# Patient Record
Sex: MALE | Race: WHITE | Employment: FULL TIME | URBAN - METROPOLITAN AREA
[De-identification: names, ages, dates, MRNs, and addresses within clinical notes are randomized per-mention and may not be internally consistent; named-entity substitution may affect disease eponyms.]

---

## 2017-01-04 ENCOUNTER — TELEPHONE (OUTPATIENT)
Dept: FAMILY MEDICINE CLINIC | Facility: CLINIC | Age: 42
End: 2017-01-04

## 2017-01-04 NOTE — TELEPHONE ENCOUNTER
----- Message from Betsy Justin MD sent at 12/30/2016  2:18 PM CST -----  Please inform patient of normal XR knee. Would he like a physical therapy referral to see if this helps with the pain?

## 2017-01-04 NOTE — TELEPHONE ENCOUNTER
Verified pt name and . Reviewed Xray results per doctor's instructions. Pt states he does not want physical therapy at this time and he will return to office if pain worsens or does not improve. Pt had no further questions at this time.

## 2017-05-01 ENCOUNTER — OFFICE VISIT (OUTPATIENT)
Dept: FAMILY MEDICINE CLINIC | Facility: CLINIC | Age: 42
End: 2017-05-01

## 2017-05-01 VITALS
TEMPERATURE: 98 F | HEART RATE: 76 BPM | BODY MASS INDEX: 41.75 KG/M2 | HEIGHT: 73 IN | SYSTOLIC BLOOD PRESSURE: 144 MMHG | WEIGHT: 315 LBS | RESPIRATION RATE: 18 BRPM | DIASTOLIC BLOOD PRESSURE: 84 MMHG

## 2017-05-01 DIAGNOSIS — M25.562 CHRONIC PAIN OF LEFT KNEE: Primary | ICD-10-CM

## 2017-05-01 DIAGNOSIS — G89.29 CHRONIC PAIN OF LEFT KNEE: Primary | ICD-10-CM

## 2017-05-01 DIAGNOSIS — F32.A DEPRESSION, UNSPECIFIED DEPRESSION TYPE: ICD-10-CM

## 2017-05-01 PROCEDURE — 99212 OFFICE O/P EST SF 10 MIN: CPT | Performed by: FAMILY MEDICINE

## 2017-05-01 PROCEDURE — 99214 OFFICE O/P EST MOD 30 MIN: CPT | Performed by: FAMILY MEDICINE

## 2017-05-02 NOTE — PROGRESS NOTES
HPI:    Aron Valero is a 43year old male presents to clinic with multiple concerns. Left Knee Pain - was seen for this issue in December, symptoms seem to be getting worse. States that location of pain varies but he feels like knee is weak.  Would nervous/anxious and does not have insomnia.          PHYSICAL EXAM:      05/01/17  1623   BP: 144/84   Pulse: 76   Temp: 98.4 °F (36.9 °C)   TempSrc: Oral   Resp: 18   Height: 6' 1\" (1.854 m)   Weight: 322 lb (146.058 kg)      Physical Exam   Constitutiona

## 2017-05-17 ENCOUNTER — OFFICE VISIT (OUTPATIENT)
Dept: ORTHOPEDICS CLINIC | Facility: CLINIC | Age: 42
End: 2017-05-17

## 2017-05-17 DIAGNOSIS — S76.112A STRAIN OF LEFT QUADRICEPS TENDON, INITIAL ENCOUNTER: Primary | ICD-10-CM

## 2017-05-17 PROCEDURE — 99212 OFFICE O/P EST SF 10 MIN: CPT | Performed by: ORTHOPAEDIC SURGERY

## 2017-05-17 PROCEDURE — 99243 OFF/OP CNSLTJ NEW/EST LOW 30: CPT | Performed by: ORTHOPAEDIC SURGERY

## 2017-05-17 RX ORDER — MELOXICAM 15 MG/1
15 TABLET ORAL DAILY
Qty: 30 TABLET | Refills: 1 | Status: SHIPPED | OUTPATIENT
Start: 2017-05-17 | End: 2018-02-19 | Stop reason: ALTCHOICE

## 2017-05-17 RX ORDER — IBUPROFEN 200 MG
10000 TABLET ORAL AS NEEDED
COMMUNITY
End: 2019-11-15

## 2017-05-17 NOTE — H&P
Chief Complaint: Left knee pain    NURSING INTAKE COMMENTS: Patient presents with:  Consult: Left knee pain -- Xrays taken 12/20/16. Onset 9 mths and states he felt knee cap shift while kneeling down. History of present illness:   This 43year old mal Negative Negative      Lachman Negative Negative      Pivot Shift Negative Negative      Posterolateral corner Negative Negative        Varus Stress        0 Degrees Negative Negative      30 Degrees Negative Negative        Valgus Stress         0 Degrees

## 2017-05-24 ENCOUNTER — OFFICE VISIT (OUTPATIENT)
Dept: FAMILY MEDICINE CLINIC | Facility: CLINIC | Age: 42
End: 2017-05-24

## 2017-05-24 ENCOUNTER — LAB ENCOUNTER (OUTPATIENT)
Dept: LAB | Age: 42
End: 2017-05-24
Attending: FAMILY MEDICINE
Payer: COMMERCIAL

## 2017-05-24 ENCOUNTER — PRIOR ORIGINAL RECORDS (OUTPATIENT)
Dept: OTHER | Age: 42
End: 2017-05-24

## 2017-05-24 VITALS
DIASTOLIC BLOOD PRESSURE: 79 MMHG | RESPIRATION RATE: 20 BRPM | BODY MASS INDEX: 40 KG/M2 | HEART RATE: 67 BPM | TEMPERATURE: 98 F | WEIGHT: 306 LBS | SYSTOLIC BLOOD PRESSURE: 130 MMHG

## 2017-05-24 DIAGNOSIS — Z00.00 ANNUAL PHYSICAL EXAM: ICD-10-CM

## 2017-05-24 DIAGNOSIS — Z00.00 ANNUAL PHYSICAL EXAM: Primary | ICD-10-CM

## 2017-05-24 PROCEDURE — 99396 PREV VISIT EST AGE 40-64: CPT | Performed by: FAMILY MEDICINE

## 2017-05-24 PROCEDURE — 80061 LIPID PANEL: CPT

## 2017-05-24 PROCEDURE — 84443 ASSAY THYROID STIM HORMONE: CPT

## 2017-05-24 PROCEDURE — 36415 COLL VENOUS BLD VENIPUNCTURE: CPT

## 2017-05-24 PROCEDURE — 83036 HEMOGLOBIN GLYCOSYLATED A1C: CPT

## 2017-05-24 PROCEDURE — 85025 COMPLETE CBC W/AUTO DIFF WBC: CPT

## 2017-05-24 PROCEDURE — 80053 COMPREHEN METABOLIC PANEL: CPT

## 2017-05-24 RX ORDER — MULTIVITAMIN
1 TABLET ORAL DAILY
COMMUNITY

## 2017-05-24 NOTE — PROGRESS NOTES
HPI:    Zaria Reagan is a 43year old male presents to clinic for annual physical exam.   Concerns/Complaints regarding health: none, no recent illnesses  Chronic Medical Issues: sees Dr. Taina Sandoval for anxiety and depression  Sexually Active?  Yes with wif PHYSICAL EXAM:      05/24/17  0902   BP: 130/79   Pulse: 67   Temp: 98.1 °F (36.7 °C)   TempSrc: Temporal   Resp: 20   Weight: 306 lb (138.801 kg)      Physical Exam   Constitutional: He is well-developed, well-nourished, and in no distress.    HENT:   He

## 2017-06-09 PROBLEM — F32.1 MAJOR DEPRESSIVE DISORDER, SINGLE EPISODE, MODERATE (HCC): Status: ACTIVE | Noted: 2017-06-09

## 2017-06-20 ENCOUNTER — TELEPHONE (OUTPATIENT)
Dept: FAMILY MEDICINE CLINIC | Facility: CLINIC | Age: 42
End: 2017-06-20

## 2017-06-22 NOTE — TELEPHONE ENCOUNTER
Spoke with pt and informed him that his current referral was approved for six visit and he only used one visit so therefore a new referral is not required. Pt voiced understanding.

## 2017-07-17 ENCOUNTER — OFFICE VISIT (OUTPATIENT)
Dept: ORTHOPEDICS CLINIC | Facility: CLINIC | Age: 42
End: 2017-07-17

## 2017-07-17 ENCOUNTER — PRIOR ORIGINAL RECORDS (OUTPATIENT)
Dept: OTHER | Age: 42
End: 2017-07-17

## 2017-07-17 ENCOUNTER — HOSPITAL ENCOUNTER (EMERGENCY)
Facility: HOSPITAL | Age: 42
Discharge: HOME | End: 2017-07-17
Attending: EMERGENCY MEDICINE
Payer: COMMERCIAL

## 2017-07-17 VITALS — HEART RATE: 74 BPM | SYSTOLIC BLOOD PRESSURE: 126 MMHG | RESPIRATION RATE: 16 BRPM | DIASTOLIC BLOOD PRESSURE: 72 MMHG

## 2017-07-17 VITALS
RESPIRATION RATE: 18 BRPM | DIASTOLIC BLOOD PRESSURE: 70 MMHG | HEIGHT: 73 IN | BODY MASS INDEX: 37.11 KG/M2 | WEIGHT: 280 LBS | HEART RATE: 70 BPM | SYSTOLIC BLOOD PRESSURE: 116 MMHG | OXYGEN SATURATION: 98 %

## 2017-07-17 DIAGNOSIS — M76.899 QUADRICEPS TENDONITIS: ICD-10-CM

## 2017-07-17 DIAGNOSIS — R55 SYNCOPE, NEAR: Primary | ICD-10-CM

## 2017-07-17 DIAGNOSIS — S76.302A HAMSTRING INJURY, LEFT, INITIAL ENCOUNTER: ICD-10-CM

## 2017-07-17 DIAGNOSIS — M25.562 ARTHRALGIA OF LEFT KNEE: Primary | ICD-10-CM

## 2017-07-17 LAB
ANION GAP SERPL CALC-SCNC: 5 MMOL/L (ref 0–18)
BASOPHILS # BLD: 0 K/UL (ref 0–0.2)
BASOPHILS NFR BLD: 0 %
BILIRUB UR QL: NEGATIVE
BUN SERPL-MCNC: 15 MG/DL (ref 8–20)
BUN/CREAT SERPL: 16.5 (ref 10–20)
CALCIUM SERPL-MCNC: 8.5 MG/DL (ref 8.5–10.5)
CHLORIDE SERPL-SCNC: 110 MMOL/L (ref 95–110)
CHOLEST SERPL-MCNC: 157 MG/DL (ref 110–200)
CLARITY UR: CLEAR
CO2 SERPL-SCNC: 23 MMOL/L (ref 22–32)
COLOR UR: YELLOW
CREAT SERPL-MCNC: 0.91 MG/DL (ref 0.5–1.5)
EOSINOPHIL # BLD: 0.1 K/UL (ref 0–0.7)
EOSINOPHIL NFR BLD: 1 %
ERYTHROCYTE [DISTWIDTH] IN BLOOD BY AUTOMATED COUNT: 13.3 % (ref 11–15)
GLUCOSE SERPL-MCNC: 124 MG/DL (ref 70–99)
GLUCOSE UR-MCNC: NEGATIVE MG/DL
HCT VFR BLD AUTO: 41.1 % (ref 41–52)
HDLC SERPL-MCNC: 28 MG/DL
HGB BLD-MCNC: 13.9 G/DL (ref 13.5–17.5)
HGB UR QL STRIP.AUTO: NEGATIVE
KETONES UR-MCNC: NEGATIVE MG/DL
LDLC SERPL CALC-MCNC: 113 MG/DL (ref 0–99)
LEUKOCYTE ESTERASE UR QL STRIP.AUTO: NEGATIVE
LYMPHOCYTES # BLD: 0.9 K/UL (ref 1–4)
LYMPHOCYTES NFR BLD: 14 %
MCH RBC QN AUTO: 29.9 PG (ref 27–32)
MCHC RBC AUTO-ENTMCNC: 33.8 G/DL (ref 32–37)
MCV RBC AUTO: 88.3 FL (ref 80–100)
MONOCYTES # BLD: 0.4 K/UL (ref 0–1)
MONOCYTES NFR BLD: 6 %
NEUTROPHILS # BLD AUTO: 5.1 K/UL (ref 1.8–7.7)
NEUTROPHILS NFR BLD: 79 %
NITRITE UR QL STRIP.AUTO: NEGATIVE
NONHDLC SERPL-MCNC: 129 MG/DL
OSMOLALITY UR CALC.SUM OF ELEC: 288 MOSM/KG (ref 275–295)
PH UR: 6 [PH] (ref 5–8)
PLATELET # BLD AUTO: 196 K/UL (ref 140–400)
PMV BLD AUTO: 9.3 FL (ref 7.4–10.3)
POTASSIUM SERPL-SCNC: 3.7 MMOL/L (ref 3.3–5.1)
PROT UR-MCNC: NEGATIVE MG/DL
RBC # BLD AUTO: 4.66 M/UL (ref 4.5–5.9)
SODIUM SERPL-SCNC: 138 MMOL/L (ref 136–144)
SP GR UR STRIP: 1.01 (ref 1–1.03)
TRIGL SERPL-MCNC: 81 MG/DL (ref 1–149)
TROPONIN I SERPL-MCNC: 0.04 NG/ML (ref ?–0.03)
TROPONIN I SERPL-MCNC: 0.04 NG/ML (ref ?–0.03)
UROBILINOGEN UR STRIP-ACNC: <2
VIT C UR-MCNC: NEGATIVE MG/DL
WBC # BLD AUTO: 6.5 K/UL (ref 4–11)

## 2017-07-17 PROCEDURE — 81003 URINALYSIS AUTO W/O SCOPE: CPT | Performed by: EMERGENCY MEDICINE

## 2017-07-17 PROCEDURE — 80061 LIPID PANEL: CPT | Performed by: EMERGENCY MEDICINE

## 2017-07-17 PROCEDURE — 80048 BASIC METABOLIC PNL TOTAL CA: CPT | Performed by: EMERGENCY MEDICINE

## 2017-07-17 PROCEDURE — 99285 EMERGENCY DEPT VISIT HI MDM: CPT

## 2017-07-17 PROCEDURE — 84484 ASSAY OF TROPONIN QUANT: CPT | Performed by: EMERGENCY MEDICINE

## 2017-07-17 PROCEDURE — 96360 HYDRATION IV INFUSION INIT: CPT

## 2017-07-17 PROCEDURE — 93010 ELECTROCARDIOGRAM REPORT: CPT | Performed by: EMERGENCY MEDICINE

## 2017-07-17 PROCEDURE — 85025 COMPLETE CBC W/AUTO DIFF WBC: CPT | Performed by: EMERGENCY MEDICINE

## 2017-07-17 PROCEDURE — 20610 DRAIN/INJ JOINT/BURSA W/O US: CPT | Performed by: ORTHOPAEDIC SURGERY

## 2017-07-17 PROCEDURE — 93005 ELECTROCARDIOGRAM TRACING: CPT

## 2017-07-17 NOTE — PROGRESS NOTES
Patient presents for follow-up. He continues to have pain in the quadriceps tendon area of his left knee but now has developed lateral hamstring pain as well and stiffness in his quad tendon as well as his hamstring tendon.   We will do formal physical the

## 2017-07-17 NOTE — PROGRESS NOTES
Per verbal order from VT, draw up 3ml of Kenalog 10 and 3ml of 1% lidocaine for cortisone injection to left knee.  Flory Tong

## 2017-07-17 NOTE — ED NOTES
Care assumed from EMS pt presents from [facility] S/P near syncopal episode after receiving a cortisone injection into his L knee. Became diaphoretic/bradycardic and subsequently received 250 ml NS and 15 mg Ephedrine prior to ED presentation.  Ar

## 2017-07-17 NOTE — PROCEDURES
Procedure: The risks and benefits of a cortisone injection were discussed with the patient. An informational sheet was also provided and the patient had ample time to review it.   Under sterile preparation, the left knee was injected with 30 mg of Kenalog

## 2017-07-17 NOTE — ED PROVIDER NOTES
Patient Seen in: [facility] AND [facility] Emergency Department    History   Patient presents with:  Syncope (cardiovascular, neurologic)    Stated Complaint:     HPI    43year old male who presents with near-syncopal episode occurring this a.m just pta.  Pt was status: Never Smoker                                                              Alcohol use: Yes           0.0 oz/week     Comment: occasional      Review of Systems    Positive for stated complaint:   Other systems are as noted in HPI.   Constitutional a Troponin 0.04 (*)     All other components within normal limits   LIPID PANEL - Abnormal; Notable for the following:     LDL Cholesterol 113 (*)     All other components within normal limits   TROPONIN I, 2 HOUR - Abnormal; Notable for the following:     T similar to previous clinic EKGs and has calcium score = 0 with normal CCTA 1 year ago. Troponin indeterminate at 0.04, same 2nd troponin. Pt exercising vigorously outpatient with no pain, syncope, or other abnormal exertional sx.  Will dc, advised pt to f/u

## 2017-07-17 NOTE — PROGRESS NOTES
07/17/17 1003   Clinical Encounter Type   Visited With Patient   Routine Visit Introduction   Crisis Visit ED  (Code Blue-Non-Critical)   Patient Spiritual Encounters   Spiritual Assessment Completed 2    introduced pt to spiritual care.  Pt had

## 2017-07-17 NOTE — ED INITIAL ASSESSMENT (HPI)
Via EMS from [facility] where he had a near syncopal episode after a cortisone injection into his L knee.  On EMS arrival bradycardic/diaphoretic Pt subsequently received a 250 ml fluid bolus and 15mg ephedrine by Dr Lui Spotted

## 2017-08-01 PROBLEM — F32.1 MAJOR DEPRESSIVE DISORDER, SINGLE EPISODE, MODERATE (HCC): Status: RESOLVED | Noted: 2017-06-09 | Resolved: 2017-08-01

## 2017-08-01 PROBLEM — F32.5 MAJOR DEPRESSIVE DISORDER, SINGLE EPISODE, IN REMISSION: Status: ACTIVE | Noted: 2017-06-09

## 2017-08-01 PROBLEM — F32.5 MAJOR DEPRESSIVE DISORDER, SINGLE EPISODE, IN REMISSION (HCC): Status: ACTIVE | Noted: 2017-06-09

## 2017-08-04 ENCOUNTER — TELEPHONE (OUTPATIENT)
Dept: CARDIOLOGY CLINIC | Facility: CLINIC | Age: 42
End: 2017-08-04

## 2017-08-04 NOTE — TELEPHONE ENCOUNTER
Pt calling to schedule hospital follow up appt. Pt was in the E.R. 7/17, pt is calling to schedule with Dr. Pamela Antonio, pls call PD:[phone], ok leave message,thank you.

## 2017-08-25 ENCOUNTER — TELEPHONE (OUTPATIENT)
Dept: FAMILY MEDICINE CLINIC | Facility: CLINIC | Age: 42
End: 2017-08-25

## 2017-08-25 DIAGNOSIS — I10 HYPERTENSION, UNSPECIFIED TYPE: Primary | ICD-10-CM

## 2017-08-29 ENCOUNTER — OFFICE VISIT (OUTPATIENT)
Dept: CARDIOLOGY CLINIC | Facility: CLINIC | Age: 42
End: 2017-08-29

## 2017-08-29 VITALS
HEART RATE: 68 BPM | SYSTOLIC BLOOD PRESSURE: 104 MMHG | WEIGHT: 258 LBS | BODY MASS INDEX: 34 KG/M2 | DIASTOLIC BLOOD PRESSURE: 60 MMHG

## 2017-08-29 DIAGNOSIS — R94.31 ABNORMAL ECG: Primary | ICD-10-CM

## 2017-08-29 PROCEDURE — 99212 OFFICE O/P EST SF 10 MIN: CPT | Performed by: INTERNAL MEDICINE

## 2017-08-29 PROCEDURE — 99214 OFFICE O/P EST MOD 30 MIN: CPT | Performed by: INTERNAL MEDICINE

## 2017-08-29 NOTE — PROGRESS NOTES
Mikki Smith is a 43year old male. Patient presents with:  Hospital F/U    HPI:   This is a pleasant 41-year-old with abnormal EKG and history of chest fluttering who now presents for assessment prior to possibly participating in a [place] run.   Laura well developed, well nourished,in no apparent distress  SKIN: no rashes,no suspicious lesions  HEENT: atraumatic, normocephalic,ears and throat are clear  NECK: supple,no adenopathy,no bruits  LUNGS: clear to auscultation  CARDIO: regular rate and rhythm,n

## 2017-09-01 ENCOUNTER — HOSPITAL ENCOUNTER (OUTPATIENT)
Dept: CV DIAGNOSTICS | Facility: HOSPITAL | Age: 42
Discharge: HOME | End: 2017-09-01
Attending: INTERNAL MEDICINE
Payer: COMMERCIAL

## 2017-09-01 ENCOUNTER — NURSE TRIAGE (OUTPATIENT)
Dept: OTHER | Age: 42
End: 2017-09-01

## 2017-09-01 DIAGNOSIS — M25.561 ACUTE PAIN OF RIGHT KNEE: Primary | ICD-10-CM

## 2017-09-01 DIAGNOSIS — R94.31 ABNORMAL ECG: ICD-10-CM

## 2017-09-01 PROCEDURE — 93017 CV STRESS TEST TRACING ONLY: CPT | Performed by: INTERNAL MEDICINE

## 2017-09-01 PROCEDURE — 93016 CV STRESS TEST SUPVJ ONLY: CPT | Performed by: INTERNAL MEDICINE

## 2017-09-01 PROCEDURE — 93350 STRESS TTE ONLY: CPT | Performed by: INTERNAL MEDICINE

## 2017-09-01 PROCEDURE — 93018 CV STRESS TEST I&R ONLY: CPT | Performed by: INTERNAL MEDICINE

## 2017-09-01 NOTE — TELEPHONE ENCOUNTER
Reason for Disposition  • Patient wants to be seen    Protocols used: KNEE INJURY-A-OH  Action Requested: Summary for Provider     []  Critical Lab, Recommendations Needed  [x] Need Additional Advice  []   FYI    []   Need Orders  [] Need Medications Stanislav Zuniga

## 2017-09-01 NOTE — TELEPHONE ENCOUNTER
Pt contacted and MD message below relayed to pt about going to the Urgent Care in [facility] today. Pt informed that an RN will follow up tomorrow. Chart sent to Call back folder.      Pt verbalizes understanding, expresses intent to comply, denies any fur

## 2017-09-01 NOTE — TELEPHONE ENCOUNTER
Please advise him to go to urgent care on [facility] in [facility] if possible.

## 2017-09-01 NOTE — IMAGING NOTE
Dr. Amor Gomez notified of significant change in ECG tracings from July 2017. Was instructed to proceed with stress echo.

## 2017-09-02 ENCOUNTER — HOSPITAL ENCOUNTER (OUTPATIENT)
Age: 42
Discharge: HOME | End: 2017-09-02
Attending: FAMILY MEDICINE
Payer: COMMERCIAL

## 2017-09-02 ENCOUNTER — APPOINTMENT (OUTPATIENT)
Dept: GENERAL RADIOLOGY | Age: 42
End: 2017-09-02
Attending: FAMILY MEDICINE
Payer: COMMERCIAL

## 2017-09-02 VITALS
HEART RATE: 59 BPM | RESPIRATION RATE: 16 BRPM | SYSTOLIC BLOOD PRESSURE: 141 MMHG | DIASTOLIC BLOOD PRESSURE: 74 MMHG | TEMPERATURE: 98 F | OXYGEN SATURATION: 99 %

## 2017-09-02 DIAGNOSIS — S83.401A SPRAIN OF COLLATERAL LIGAMENT OF RIGHT KNEE, INITIAL ENCOUNTER: Primary | ICD-10-CM

## 2017-09-02 PROCEDURE — 73562 X-RAY EXAM OF KNEE 3: CPT | Performed by: FAMILY MEDICINE

## 2017-09-02 PROCEDURE — 99214 OFFICE O/P EST MOD 30 MIN: CPT

## 2017-09-02 PROCEDURE — 99213 OFFICE O/P EST LOW 20 MIN: CPT

## 2017-09-02 RX ORDER — ETODOLAC 500 MG/1
500 TABLET, FILM COATED ORAL 2 TIMES DAILY
Qty: 28 TABLET | Refills: 0 | Status: SHIPPED | OUTPATIENT
Start: 2017-09-02 | End: 2017-09-16

## 2017-09-02 NOTE — TELEPHONE ENCOUNTER
Being seen right now in [address] [place] [address]. Stated will call him next week to check on how he is doing.

## 2017-09-02 NOTE — ED INITIAL ASSESSMENT (HPI)
Pt complaints of right knee pain since Thursday injured knee while playing softball reports banged knee against another player pain to outside of knee. Slight pain with walking worse with going downstairs.

## 2017-09-02 NOTE — ED NOTES
All orders complete pt leaving IC stable no acute distress noted, ambulates steady gait.  RX given and explained pt verbalizes DC and follow up instructions

## 2017-09-07 ENCOUNTER — TELEPHONE (OUTPATIENT)
Dept: CARDIOLOGY CLINIC | Facility: CLINIC | Age: 42
End: 2017-09-07

## 2017-09-07 NOTE — TELEPHONE ENCOUNTER
Missed that, detailed message left for pt per his request.  Encouraged to call with questions or concerns.

## 2017-09-07 NOTE — TELEPHONE ENCOUNTER
Please advise on test results and if there are any recommendations prior to participating is sports event.

## 2017-11-16 ENCOUNTER — TELEPHONE (OUTPATIENT)
Dept: FAMILY MEDICINE CLINIC | Facility: CLINIC | Age: 42
End: 2017-11-16

## 2017-11-17 NOTE — TELEPHONE ENCOUNTER
Called patient. He states the following:    Slowing of heart  Fainting    Patient is now in ICU  And was diagnosed with very vasovagal response. Patient has pace maker. Patient informed this message will be given to [address]

## 2017-11-20 ENCOUNTER — TELEPHONE (OUTPATIENT)
Dept: CARDIOLOGY CLINIC | Facility: CLINIC | Age: 42
End: 2017-11-20

## 2017-11-20 ENCOUNTER — TELEPHONE (OUTPATIENT)
Dept: FAMILY MEDICINE CLINIC | Facility: CLINIC | Age: 42
End: 2017-11-20

## 2017-11-20 DIAGNOSIS — R55 VASOVAGAL SYNCOPE: Primary | ICD-10-CM

## 2017-11-20 NOTE — TELEPHONE ENCOUNTER
Pts wife states that the pt. Was discharged yesterday from [facility]. She states that the pt. Had passed out. Wife states that the will need to get a Holter monitor. Wife is requesting for pt.  To be seen this week by any Cardiologist. I let the spo

## 2017-11-20 NOTE — TELEPHONE ENCOUNTER
Dr. Gertrude Castorena,    Pt's wife called requesting a referral for Dr. Jimmie Aiken for hospital follow up. Please advise and sign off on referral.      Thank you, Managed Care.

## 2017-11-20 NOTE — TELEPHONE ENCOUNTER
S/w Vinicio, wife and states pt was at [facility] for a couple of days due to Syncopal episode.  Per melvin , pt was told he has Vasodepressor syncope and will need to see an EP specialist.     Vinicio is aware that we have an EP specialist here at th

## 2017-11-20 NOTE — TELEPHONE ENCOUNTER
Pt was made aware and is agreeable. Pt is concerned that he might have another episode and would like to be seen. Offered APN and they are agreeable. Pt will see APN tomorrow and they will bring records from [facility].

## 2017-11-21 ENCOUNTER — OFFICE VISIT (OUTPATIENT)
Dept: CARDIOLOGY CLINIC | Facility: CLINIC | Age: 42
End: 2017-11-21

## 2017-11-21 ENCOUNTER — PRIOR ORIGINAL RECORDS (OUTPATIENT)
Dept: OTHER | Age: 42
End: 2017-11-21

## 2017-11-21 ENCOUNTER — TELEPHONE (OUTPATIENT)
Dept: FAMILY MEDICINE CLINIC | Facility: CLINIC | Age: 42
End: 2017-11-21

## 2017-11-21 VITALS
HEART RATE: 68 BPM | WEIGHT: 245 LBS | SYSTOLIC BLOOD PRESSURE: 100 MMHG | DIASTOLIC BLOOD PRESSURE: 60 MMHG | BODY MASS INDEX: 32.47 KG/M2 | HEIGHT: 73 IN

## 2017-11-21 DIAGNOSIS — R55 SYNCOPE, UNSPECIFIED SYNCOPE TYPE: Primary | ICD-10-CM

## 2017-11-21 DIAGNOSIS — Z95.0 PACEMAKER: ICD-10-CM

## 2017-11-21 LAB
ALBUMIN: 4.2 G/DL
ALT (SGPT): 23 U/L
AST (SGOT): 20 U/L
BILIRUBIN TOTAL: 0.7 MG/DL
BUN: 15 MG/DL
BUN: 16 MG/DL
CALCIUM: 8.5 MG/DL
CALCIUM: 9.2 MG/DL
CHLORIDE: 106 MEQ/L
CHLORIDE: 110 MEQ/L
CHOLESTEROL, TOTAL: 157 MG/DL
CREATININE, SERUM: 0.9 MG/DL
CREATININE, SERUM: 0.91 MG/DL
GLOBULIN: 2.6 G/DL
GLUCOSE: 124 MG/DL
GLUCOSE: 124 MG/DL
GLUCOSE: 88 MG/DL
GLUCOSE: 88 MG/DL
HDL CHOLESTEROL: 28 MG/DL
HEMATOCRIT: 41.1 %
HEMOGLOBIN A1C: 5.4 %
HEMOGLOBIN: 13.9 G/DL
LDL CHOLESTEROL: 113 MG/DL
NON-HDL CHOLESTEROL: 129 MG/DL
PLATELETS: 196 K/UL
POTASSIUM, SERUM: 3.9 MEQ/L
PROTEIN, TOTAL: 6.8 G/DL
RED BLOOD COUNT: 4.66 X 10-6/U
SGOT (AST): 20 IU/L
SGPT (ALT): 23 IU/L
SODIUM: 138 MEQ/L
SODIUM: 140 MEQ/L
THYROID STIMULATING HORMONE: 1.78 MLU/L
TRIGLYCERIDES: 81 MG/DL
WHITE BLOOD COUNT: 6.5 X 10-3/U

## 2017-11-21 PROCEDURE — 99212 OFFICE O/P EST SF 10 MIN: CPT | Performed by: NURSE PRACTITIONER

## 2017-11-21 PROCEDURE — 99214 OFFICE O/P EST MOD 30 MIN: CPT | Performed by: NURSE PRACTITIONER

## 2017-11-21 RX ORDER — FLUDROCORTISONE ACETATE 0.1 MG/1
0.1 TABLET ORAL DAILY
COMMUNITY
End: 2019-06-17

## 2017-11-21 NOTE — PROGRESS NOTES
Poppy Alvarez is a 43year old male. Patient presents with: Follow - Up: [facility] follow - Was told he needed a Holter Monitor    HPI:   Patient comes in today for a checkup after hospitalization.   He sees Dr. Samantha Mcknight he had a syncopal epi pain  GI: denies abdominal pain and denies heartburn  NEURO: denies headaches    EXAM:   /60   Pulse 68   Ht 6' 1\" (1.854 m)   Wt 245 lb (111.1 kg)   BMI 32.32 kg/m²   GENERAL: well developed, well nourished,in no apparent distress  SKIN: no rashes,

## 2017-11-21 NOTE — TELEPHONE ENCOUNTER
Pt called to request a referral to see Dr Zulma Hauser. Pt has an appointment with Dr Zulma Hauser today (11/21/17) at 1500. Please sign referral if you agree.  Thank you, Managed Care

## 2017-11-21 NOTE — TELEPHONE ENCOUNTER
Pt's wife called in to f/u on the referral request.  Pt's appt is for today at 3:00pm.  Pt's wife is requesting a confirmation call when the referral is ready.

## 2017-12-05 RX ORDER — SODIUM CHLORIDE 9 MG/ML
INJECTION, SOLUTION INTRAVENOUS ONCE
Status: DISCONTINUED | OUTPATIENT
Start: 2017-12-07 | End: 2017-12-05

## 2017-12-07 ENCOUNTER — HOSPITAL ENCOUNTER (OUTPATIENT)
Dept: INTERVENTIONAL RADIOLOGY/VASCULAR | Facility: HOSPITAL | Age: 42
Discharge: HOME | End: 2017-12-07
Attending: INTERNAL MEDICINE | Admitting: INTERNAL MEDICINE
Payer: COMMERCIAL

## 2017-12-07 VITALS
RESPIRATION RATE: 16 BRPM | WEIGHT: 250 LBS | BODY MASS INDEX: 33.13 KG/M2 | DIASTOLIC BLOOD PRESSURE: 81 MMHG | SYSTOLIC BLOOD PRESSURE: 141 MMHG | OXYGEN SATURATION: 98 % | HEART RATE: 72 BPM | HEIGHT: 73 IN

## 2017-12-07 DIAGNOSIS — R00.1 BRADYCARDIA: ICD-10-CM

## 2017-12-07 PROCEDURE — 33282 HC INSERT SUBCUT CARDIAC RHYTHM MONITOR INCL PROGRAM: CPT

## 2017-12-07 PROCEDURE — 0JH632Z INSERTION OF MONITORING DEVICE INTO CHEST SUBCUTANEOUS TISSUE AND FASCIA, PERCUTANEOUS APPROACH: ICD-10-PCS | Performed by: INTERNAL MEDICINE

## 2017-12-07 NOTE — PROGRESS NOTES
Pt s/p Loop recorder implant. Pt tolerated procedure well. 0.94 amplitude upon interrogation. D/C instructions reviewed w/ pt and wife who verbalized understanding.   D/C'd to home in stable condition

## 2017-12-07 NOTE — PROCEDURES
Procedure- Loop (Confirm) device implant (Loop Monitor)    Laura Oleary MD    Indication- Arrhythmia evaluation, recurrent syncope, bradycardia of unknown significance.     Complication- none  EBL - minimal  Sedation - local lidocaine    Methods- Th

## 2017-12-09 ENCOUNTER — HOSPITAL ENCOUNTER (OUTPATIENT)
Age: 42
Discharge: HOME | End: 2017-12-09
Attending: FAMILY MEDICINE
Payer: COMMERCIAL

## 2017-12-09 VITALS
OXYGEN SATURATION: 100 % | TEMPERATURE: 98 F | WEIGHT: 250 LBS | HEIGHT: 73 IN | SYSTOLIC BLOOD PRESSURE: 149 MMHG | HEART RATE: 65 BPM | DIASTOLIC BLOOD PRESSURE: 74 MMHG | RESPIRATION RATE: 12 BRPM | BODY MASS INDEX: 33.13 KG/M2

## 2017-12-09 DIAGNOSIS — T85.9XXA COMPLICATIONS DUE TO DEVICE, IMPLANT, AND GRAFT, INITIAL ENCOUNTER: Primary | ICD-10-CM

## 2017-12-09 PROCEDURE — 99212 OFFICE O/P EST SF 10 MIN: CPT

## 2017-12-09 NOTE — ED INITIAL ASSESSMENT (HPI)
Patient had a loop recorder placed on Thursday. He is concerned about drainage coming from the wound. Steri-strips in place. Sore but not really painful.

## 2017-12-09 NOTE — ED PROVIDER NOTES
Patient Seen in: [address]    History   Patient presents with:  Rash Skin Problem (integumentary)    Stated Complaint: chest pain; issues    HPI    55-year-old male 3 days after loop recorder implant with complaints of distal pulses. Pulmonary/Chest: Effort normal and breath sounds normal.   Abdominal: Soft. Bowel sounds are normal.   Neurological: He is alert and oriented to person, place, and time. He has normal reflexes. Skin: Skin is warm and dry.    Left anterio

## 2017-12-11 ENCOUNTER — PRIOR ORIGINAL RECORDS (OUTPATIENT)
Dept: OTHER | Age: 42
End: 2017-12-11

## 2017-12-22 ENCOUNTER — MYAURORA ACCOUNT LINK (OUTPATIENT)
Dept: OTHER | Age: 42
End: 2017-12-22

## 2017-12-22 ENCOUNTER — PRIOR ORIGINAL RECORDS (OUTPATIENT)
Dept: OTHER | Age: 42
End: 2017-12-22

## 2017-12-26 ENCOUNTER — PRIOR ORIGINAL RECORDS (OUTPATIENT)
Dept: OTHER | Age: 42
End: 2017-12-26

## 2018-01-19 ENCOUNTER — PRIOR ORIGINAL RECORDS (OUTPATIENT)
Dept: OTHER | Age: 43
End: 2018-01-19

## 2018-01-26 ENCOUNTER — PRIOR ORIGINAL RECORDS (OUTPATIENT)
Dept: OTHER | Age: 43
End: 2018-01-26

## 2018-01-26 ENCOUNTER — OFFICE VISIT (OUTPATIENT)
Dept: FAMILY MEDICINE CLINIC | Facility: CLINIC | Age: 43
End: 2018-01-26

## 2018-01-26 VITALS
TEMPERATURE: 98 F | WEIGHT: 248.81 LBS | SYSTOLIC BLOOD PRESSURE: 125 MMHG | BODY MASS INDEX: 32.98 KG/M2 | DIASTOLIC BLOOD PRESSURE: 71 MMHG | HEART RATE: 59 BPM | HEIGHT: 73 IN | RESPIRATION RATE: 14 BRPM

## 2018-01-26 DIAGNOSIS — M72.2 PLANTAR FASCIITIS OF RIGHT FOOT: ICD-10-CM

## 2018-01-26 DIAGNOSIS — G89.29 CHRONIC RIGHT SHOULDER PAIN: Primary | ICD-10-CM

## 2018-01-26 DIAGNOSIS — M25.511 CHRONIC RIGHT SHOULDER PAIN: Primary | ICD-10-CM

## 2018-01-26 PROCEDURE — 99214 OFFICE O/P EST MOD 30 MIN: CPT | Performed by: FAMILY MEDICINE

## 2018-01-26 PROCEDURE — 99212 OFFICE O/P EST SF 10 MIN: CPT | Performed by: FAMILY MEDICINE

## 2018-01-27 NOTE — PROGRESS NOTES
HPI:    Poppy Alvarez is a 43year old male presents to clinic with multiple concerns. States that for the past 3-4 months, his right shoulder has been in pain.  Says he thought he strained it exercising but he has been on light activity for the past by mouth daily. Disp: 30 tablet Rfl: 1       Allergies:  No Known Allergies      ROS:   See HPI   \"  PHYSICAL EXAM:      01/26/18  0937   BP: 125/71   BP Location: Right arm   Patient Position: Sitting   Cuff Size: adult   Pulse: 59   Resp: 14   Temp: 97.

## 2018-01-28 ENCOUNTER — HOSPITAL ENCOUNTER (OUTPATIENT)
Age: 43
Discharge: HOME | End: 2018-01-28
Attending: FAMILY MEDICINE
Payer: COMMERCIAL

## 2018-01-28 ENCOUNTER — HOSPITAL ENCOUNTER (OUTPATIENT)
Dept: GENERAL RADIOLOGY | Age: 43
Discharge: HOME | End: 2018-01-28
Attending: FAMILY MEDICINE
Payer: COMMERCIAL

## 2018-01-28 VITALS
SYSTOLIC BLOOD PRESSURE: 144 MMHG | RESPIRATION RATE: 20 BRPM | TEMPERATURE: 99 F | HEART RATE: 72 BPM | DIASTOLIC BLOOD PRESSURE: 75 MMHG | OXYGEN SATURATION: 99 %

## 2018-01-28 DIAGNOSIS — J02.0 STREP PHARYNGITIS: Primary | ICD-10-CM

## 2018-01-28 DIAGNOSIS — G89.29 CHRONIC RIGHT SHOULDER PAIN: ICD-10-CM

## 2018-01-28 DIAGNOSIS — M25.511 CHRONIC RIGHT SHOULDER PAIN: ICD-10-CM

## 2018-01-28 LAB — S PYO AG THROAT QL: POSITIVE

## 2018-01-28 PROCEDURE — 87430 STREP A AG IA: CPT

## 2018-01-28 PROCEDURE — 99214 OFFICE O/P EST MOD 30 MIN: CPT

## 2018-01-28 PROCEDURE — 99213 OFFICE O/P EST LOW 20 MIN: CPT

## 2018-01-28 PROCEDURE — 73030 X-RAY EXAM OF SHOULDER: CPT | Performed by: FAMILY MEDICINE

## 2018-01-28 NOTE — ED PROVIDER NOTES
Patient Seen in: [place] [facility]    History   Patient presents with:  Sore Throat    Stated Complaint: Sore throat - patient is in xray getting xrays right now    HPI  Jared Martinez is here with a day or two of increasing sore throat exudate. Eyes: Conjunctivae and EOM are normal. Pupils are equal, round, and reactive to light. Right eye exhibits no discharge. Left eye exhibits no discharge. No scleral icterus. Neck: Normal range of motion. Neck supple. No JVD present.  No tracheal

## 2018-01-28 NOTE — ED INITIAL ASSESSMENT (HPI)
Pt here with a sore throat that has been going on for 2 days, pt denies any fevers, pt states his son was recently diagnosed with strep throat, pt also complains of left ear pain as well

## 2018-02-05 ENCOUNTER — PRIOR ORIGINAL RECORDS (OUTPATIENT)
Dept: OTHER | Age: 43
End: 2018-02-05

## 2018-02-19 ENCOUNTER — OFFICE VISIT (OUTPATIENT)
Dept: ORTHOPEDICS CLINIC | Facility: CLINIC | Age: 43
End: 2018-02-19

## 2018-02-19 VITALS — HEART RATE: 64 BPM | SYSTOLIC BLOOD PRESSURE: 126 MMHG | RESPIRATION RATE: 16 BRPM | DIASTOLIC BLOOD PRESSURE: 72 MMHG

## 2018-02-19 DIAGNOSIS — M25.511 PAIN IN JOINT OF RIGHT SHOULDER: Primary | ICD-10-CM

## 2018-02-19 DIAGNOSIS — IMO0002 DISORDER OF ROTATOR CUFF SYNDROME OF RIGHT SHOULDER AND ALLIED DISORDER: ICD-10-CM

## 2018-02-19 PROCEDURE — 20610 DRAIN/INJ JOINT/BURSA W/O US: CPT | Performed by: ORTHOPAEDIC SURGERY

## 2018-02-19 PROCEDURE — 99212 OFFICE O/P EST SF 10 MIN: CPT | Performed by: ORTHOPAEDIC SURGERY

## 2018-02-19 PROCEDURE — 99213 OFFICE O/P EST LOW 20 MIN: CPT | Performed by: ORTHOPAEDIC SURGERY

## 2018-02-19 NOTE — PROGRESS NOTES
Per verbal order from VT, draw up 2ml of Kenalog 10 and 2ml of 1% lidocaine for cortisone injection to right shoulder Cassia Tong

## 2018-02-19 NOTE — H&P
Chief Complaint: Right shoulder pain    NURSING INTAKE COMMENTS: Patient presents with:  Shoulder Pain: Right - onset 6 mo ago after some stretching he felt sorness - with time became worse - has reduced ROM - had sx on this shoulder about 25 years ago - s Atrophy None None   Bruising None None   Strength        Supraspinatus 5/5 4/5      Infraspinatus 5/5 5/5      Teres Minor 5/5 5/5      Subscapularis 5/5 5/5      Deltoid 5/5 5/5      Biceps 5/5 5/5        Pain         Rotator Cuff resistance none +++

## 2018-02-21 ENCOUNTER — MYAURORA ACCOUNT LINK (OUTPATIENT)
Dept: OTHER | Age: 43
End: 2018-02-21

## 2018-02-26 ENCOUNTER — OFFICE VISIT (OUTPATIENT)
Dept: PHYSICAL THERAPY | Facility: HOSPITAL | Age: 43
End: 2018-02-26
Attending: ORTHOPAEDIC SURGERY
Payer: COMMERCIAL

## 2018-02-26 DIAGNOSIS — IMO0002 DISORDER OF ROTATOR CUFF SYNDROME OF RIGHT SHOULDER AND ALLIED DISORDER: ICD-10-CM

## 2018-02-26 PROCEDURE — 97162 PT EVAL MOD COMPLEX 30 MIN: CPT

## 2018-02-26 PROCEDURE — 97530 THERAPEUTIC ACTIVITIES: CPT

## 2018-02-26 NOTE — PROGRESS NOTES
SHOULDER EVALUATION:   Referring Physician: Dr. Mcmahon Child  Diagnosis: Disorder of rotator cuff syndrome of right shoulder and allied disorder (M75.101)     Date of Service: 2/26/2018     PATIENT SUMMARY   Oly Palma is a 43year old y/o male who pr decreased scapular strength and poor postural awareness contributing to symptoms. Possibly cervical component to arm pain as patient responded positivity to mechanical exercises of the the neck.   Anne Cazares would benefit from skilled Physical Therapy to address will report improved ability to sleep without waking due to shoulder pain  · Pt will increase shoulder AROM IR to be WNL to be able to reach in back pocket, tuck in shirt, and turn steering wheel without pain  · Pt will improve shoulder strength throughout

## 2018-03-02 ENCOUNTER — APPOINTMENT (OUTPATIENT)
Dept: PHYSICAL THERAPY | Facility: HOSPITAL | Age: 43
End: 2018-03-02
Attending: ORTHOPAEDIC SURGERY
Payer: COMMERCIAL

## 2018-03-09 ENCOUNTER — OFFICE VISIT (OUTPATIENT)
Dept: PHYSICAL THERAPY | Facility: HOSPITAL | Age: 43
End: 2018-03-09
Attending: ORTHOPAEDIC SURGERY
Payer: COMMERCIAL

## 2018-03-09 DIAGNOSIS — IMO0002 DISORDER OF ROTATOR CUFF SYNDROME OF RIGHT SHOULDER AND ALLIED DISORDER: ICD-10-CM

## 2018-03-09 PROCEDURE — 97110 THERAPEUTIC EXERCISES: CPT

## 2018-03-09 NOTE — PROGRESS NOTES
Diagnosis: Disorder of rotator cuff syndrome of right shoulder and allied disorder (M75.101)  Authorized # of Visits:  2        Insurance:BCBS PPO   Next MD visit: not scheduled  Fall Risk: standard         Precautions: n/a           Medication Changes sin

## 2018-03-12 ENCOUNTER — APPOINTMENT (OUTPATIENT)
Dept: PHYSICAL THERAPY | Facility: HOSPITAL | Age: 43
End: 2018-03-12
Attending: ORTHOPAEDIC SURGERY
Payer: COMMERCIAL

## 2018-03-16 ENCOUNTER — OFFICE VISIT (OUTPATIENT)
Dept: PHYSICAL THERAPY | Facility: HOSPITAL | Age: 43
End: 2018-03-16
Attending: ORTHOPAEDIC SURGERY
Payer: COMMERCIAL

## 2018-03-16 DIAGNOSIS — IMO0002 DISORDER OF ROTATOR CUFF SYNDROME OF RIGHT SHOULDER AND ALLIED DISORDER: ICD-10-CM

## 2018-03-16 PROCEDURE — 97110 THERAPEUTIC EXERCISES: CPT

## 2018-03-16 NOTE — PROGRESS NOTES
Diagnosis: Disorder of rotator cuff syndrome of right shoulder and allied disorder (M75.101)  Authorized # of Visits:  3        Insurance:BCBS PPO   Next MD visit: not scheduled  Fall Risk: standard         Precautions: n/a           Medication Changes sin

## 2018-03-19 ENCOUNTER — APPOINTMENT (OUTPATIENT)
Dept: PHYSICAL THERAPY | Facility: HOSPITAL | Age: 43
End: 2018-03-19
Attending: ORTHOPAEDIC SURGERY
Payer: COMMERCIAL

## 2018-03-23 ENCOUNTER — OFFICE VISIT (OUTPATIENT)
Dept: PHYSICAL THERAPY | Facility: HOSPITAL | Age: 43
End: 2018-03-23
Attending: ORTHOPAEDIC SURGERY
Payer: COMMERCIAL

## 2018-03-23 DIAGNOSIS — IMO0002 DISORDER OF ROTATOR CUFF SYNDROME OF RIGHT SHOULDER AND ALLIED DISORDER: ICD-10-CM

## 2018-03-23 PROCEDURE — 97110 THERAPEUTIC EXERCISES: CPT

## 2018-03-23 NOTE — PROGRESS NOTES
Diagnosis: Disorder of rotator cuff syndrome of right shoulder and allied disorder (M75.101)  Authorized # of Visits:  4        Insurance:BCBS PPO   Next MD visit: not scheduled  Fall Risk: standard         Precautions: n/a           Medication Changes sin

## 2018-03-26 ENCOUNTER — APPOINTMENT (OUTPATIENT)
Dept: PHYSICAL THERAPY | Facility: HOSPITAL | Age: 43
End: 2018-03-26
Attending: ORTHOPAEDIC SURGERY
Payer: COMMERCIAL

## 2018-03-30 ENCOUNTER — OFFICE VISIT (OUTPATIENT)
Dept: PHYSICAL THERAPY | Facility: HOSPITAL | Age: 43
End: 2018-03-30
Attending: ORTHOPAEDIC SURGERY
Payer: COMMERCIAL

## 2018-03-30 DIAGNOSIS — IMO0002 DISORDER OF ROTATOR CUFF SYNDROME OF RIGHT SHOULDER AND ALLIED DISORDER: ICD-10-CM

## 2018-03-30 PROCEDURE — 97110 THERAPEUTIC EXERCISES: CPT

## 2018-03-30 NOTE — PROGRESS NOTES
DISCHARGE SUMMARY    Diagnosis: Disorder of rotator cuff syndrome of right shoulder and allied disorder (M75.101)  Authorized # of Visits:  5        Insurance:BCBS PPO   Next MD visit: not scheduled  Fall Risk: standard         Precautions: n/a           M independent and compliant with comprehensive HEP to maintain progress achieved in PT MET      Plan: Discharge with HEP    Charges: 3TE       Total Timed Treatment: 40 min  Total Treatment Time: 40 min    Thank you for your referral. Please co-sign or sign

## 2018-04-02 ENCOUNTER — APPOINTMENT (OUTPATIENT)
Dept: PHYSICAL THERAPY | Facility: HOSPITAL | Age: 43
End: 2018-04-02
Attending: ORTHOPAEDIC SURGERY
Payer: COMMERCIAL

## 2018-05-03 NOTE — TELEPHONE ENCOUNTER
Triamcinolone Medication refilled for 90 days per protocol.     Refill Protocol Appointment Criteria  · Appointment scheduled in the past 12 months or in the next 3 months  Recent Outpatient Visits            1 month ago Disorder of rotator cuff syndrome of

## 2018-05-03 NOTE — TELEPHONE ENCOUNTER
From: Aron Valero  Sent: 5/3/2018 12:05 PM CDT  Subject: Medication Renewal Request    Aron Valero would like a refill of the following medications:     triamcinolone acetonide 0.1 % External Cream Janeen Walker MD]   Patient Comment: very renzo

## 2018-06-19 ENCOUNTER — LAB ENCOUNTER (OUTPATIENT)
Dept: LAB | Age: 43
End: 2018-06-19
Attending: FAMILY MEDICINE
Payer: COMMERCIAL

## 2018-06-19 ENCOUNTER — TELEPHONE (OUTPATIENT)
Dept: SURGERY | Facility: CLINIC | Age: 43
End: 2018-06-19

## 2018-06-19 ENCOUNTER — OFFICE VISIT (OUTPATIENT)
Dept: FAMILY MEDICINE CLINIC | Facility: CLINIC | Age: 43
End: 2018-06-19

## 2018-06-19 VITALS
DIASTOLIC BLOOD PRESSURE: 81 MMHG | HEIGHT: 73 IN | SYSTOLIC BLOOD PRESSURE: 129 MMHG | WEIGHT: 235 LBS | TEMPERATURE: 97 F | BODY MASS INDEX: 31.14 KG/M2 | RESPIRATION RATE: 14 BRPM | HEART RATE: 56 BPM

## 2018-06-19 DIAGNOSIS — R68.82 DECREASED SEX DRIVE: ICD-10-CM

## 2018-06-19 DIAGNOSIS — J30.9 ALLERGIC RHINITIS, UNSPECIFIED SEASONALITY, UNSPECIFIED TRIGGER: ICD-10-CM

## 2018-06-19 DIAGNOSIS — R53.83 OTHER FATIGUE: ICD-10-CM

## 2018-06-19 DIAGNOSIS — Z00.00 ANNUAL PHYSICAL EXAM: ICD-10-CM

## 2018-06-19 DIAGNOSIS — K64.9 HEMORRHOIDS, UNSPECIFIED HEMORRHOID TYPE: ICD-10-CM

## 2018-06-19 DIAGNOSIS — Z00.00 ANNUAL PHYSICAL EXAM: Primary | ICD-10-CM

## 2018-06-19 PROCEDURE — 80061 LIPID PANEL: CPT

## 2018-06-19 PROCEDURE — 80053 COMPREHEN METABOLIC PANEL: CPT

## 2018-06-19 PROCEDURE — 82785 ASSAY OF IGE: CPT

## 2018-06-19 PROCEDURE — 85025 COMPLETE CBC W/AUTO DIFF WBC: CPT

## 2018-06-19 PROCEDURE — 83036 HEMOGLOBIN GLYCOSYLATED A1C: CPT

## 2018-06-19 PROCEDURE — 99396 PREV VISIT EST AGE 40-64: CPT | Performed by: FAMILY MEDICINE

## 2018-06-19 PROCEDURE — 36415 COLL VENOUS BLD VENIPUNCTURE: CPT

## 2018-06-19 PROCEDURE — 84402 ASSAY OF FREE TESTOSTERONE: CPT

## 2018-06-19 PROCEDURE — 84403 ASSAY OF TOTAL TESTOSTERONE: CPT

## 2018-06-19 PROCEDURE — 86003 ALLG SPEC IGE CRUDE XTRC EA: CPT

## 2018-06-19 PROCEDURE — 84443 ASSAY THYROID STIM HORMONE: CPT

## 2018-06-19 PROCEDURE — 99214 OFFICE O/P EST MOD 30 MIN: CPT | Performed by: FAMILY MEDICINE

## 2018-06-19 NOTE — PROGRESS NOTES
HPI:    Carlos Mabry is a 37year old male presents to clinic for an annual physical exam.   Notes fatigue and a decreased sex drive. Says that he sleeps 7 hours a night. Is exercising daily and he has improved his diet.  Would like his testosterone c Allergies:  No Known Allergies      ROS:   Review of Systems   Constitutional: Negative. HENT: Negative. Eyes: Negative. Respiratory: Negative. Cardiovascular: Negative. Gastrointestinal: Negative. Genitourinary: Negative.     Muscul drive  Plan: TESTOSTERONE,FREE AND TOTALCBC WITH DIFFERENTIAL WITH PLATELET, COMP         METABOLIC PANEL (14), TSH W REFLEX TO FREE T4  - will follow up labs, if all normal, symptoms may be related to depression, pre-existing issue.  Was seeing meg Platt

## 2018-06-22 ENCOUNTER — TELEPHONE (OUTPATIENT)
Dept: SURGERY | Facility: CLINIC | Age: 43
End: 2018-06-22

## 2018-06-22 ENCOUNTER — OFFICE VISIT (OUTPATIENT)
Dept: SURGERY | Facility: CLINIC | Age: 43
End: 2018-06-22

## 2018-06-22 VITALS — BODY MASS INDEX: 31.01 KG/M2 | HEIGHT: 73 IN | WEIGHT: 234 LBS

## 2018-06-22 DIAGNOSIS — K59.04 CHRONIC IDIOPATHIC CONSTIPATION: ICD-10-CM

## 2018-06-22 DIAGNOSIS — K60.0 ACUTE POSTERIOR ANAL FISSURE: Primary | ICD-10-CM

## 2018-06-22 DIAGNOSIS — K64.1 GRADE II HEMORRHOIDS: ICD-10-CM

## 2018-06-22 PROCEDURE — 99212 OFFICE O/P EST SF 10 MIN: CPT | Performed by: SURGERY

## 2018-06-22 PROCEDURE — 99204 OFFICE O/P NEW MOD 45 MIN: CPT | Performed by: SURGERY

## 2018-06-22 PROCEDURE — 46600 DIAGNOSTIC ANOSCOPY SPX: CPT | Performed by: SURGERY

## 2018-06-22 RX ORDER — METRONIDAZOLE 500 MG/1
500 TABLET ORAL 3 TIMES DAILY
Qty: 42 TABLET | Refills: 0 | Status: SHIPPED | OUTPATIENT
Start: 2018-06-22 | End: 2018-07-06

## 2018-06-22 RX ORDER — CIPROFLOXACIN 500 MG/1
500 TABLET, FILM COATED ORAL 2 TIMES DAILY
Qty: 28 TABLET | Refills: 0 | Status: SHIPPED | OUTPATIENT
Start: 2018-06-22 | End: 2018-07-06

## 2018-06-22 NOTE — PROGRESS NOTES
Visit Note    Active Problems   Acute posterior anal fissure  (primary encounter diagnosis)  Grade ii hemorrhoids  Chronic idiopathic constipation  Chief Complaint: Patient presents with:  Hemorrhoids: Pt referred by DR. Peyton Loza regarding hemorroids x 5 m Grandfather      Social History    Social History  Social History   Marital status:   Spouse name: N/A    Years of education: N/A  Number of children: 3     Occupational History  Financial       Social History Main Topics   Smoking status: Never Smo Abdominal: Soft. Bowel sounds are normal.   Genitourinary:   Genitourinary Comments: Posterior anal Fissure and Grade II hemorrhoids. Musculoskeletal: Normal range of motion. He exhibits no edema, tenderness or deformity.    Lymphadenopathy:     He has

## 2018-06-22 NOTE — TELEPHONE ENCOUNTER
Spoke to pt. And he is wanting to know if surgery is an option to optimize  Healing. The patient is training for races and would like to have surgery if the healing process would be expedited. Informed pt.  That we will discuss with Dr. Stephany Nelson when he r

## 2018-06-22 NOTE — TELEPHONE ENCOUNTER
pt called. He was seen today with MEV. Pt is asking if surgery is an option and if yes, he would like to have surgery next week. Please advise. Thank you.

## 2018-06-25 RX ORDER — LIDOCAINE 50 MG/G
OINTMENT TOPICAL
Qty: 1 TUBE | Refills: 0 | Status: SHIPPED | OUTPATIENT
Start: 2018-06-25 | End: 2018-11-09

## 2018-06-25 NOTE — TELEPHONE ENCOUNTER
Discussed with MEV- Surgery would be an option, he can make an appointment with Dr. Teresa Burton to discuss. I called pt, explained MEV's recommendations.  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

## 2018-06-29 ENCOUNTER — PATIENT MESSAGE (OUTPATIENT)
Dept: FAMILY MEDICINE CLINIC | Facility: CLINIC | Age: 43
End: 2018-06-29

## 2018-06-29 DIAGNOSIS — R79.89 LOW TESTOSTERONE LEVEL IN MALE: Primary | ICD-10-CM

## 2018-06-29 DIAGNOSIS — M79.671 RIGHT FOOT PAIN: ICD-10-CM

## 2018-07-23 ENCOUNTER — APPOINTMENT (OUTPATIENT)
Dept: LAB | Facility: HOSPITAL | Age: 43
End: 2018-07-23
Attending: INTERNAL MEDICINE
Payer: COMMERCIAL

## 2018-07-23 ENCOUNTER — OFFICE VISIT (OUTPATIENT)
Dept: ENDOCRINOLOGY CLINIC | Facility: CLINIC | Age: 43
End: 2018-07-23
Payer: COMMERCIAL

## 2018-07-23 VITALS
BODY MASS INDEX: 31.28 KG/M2 | SYSTOLIC BLOOD PRESSURE: 122 MMHG | HEIGHT: 73 IN | HEART RATE: 50 BPM | DIASTOLIC BLOOD PRESSURE: 78 MMHG | WEIGHT: 236 LBS

## 2018-07-23 DIAGNOSIS — N52.9 ERECTILE DYSFUNCTION, UNSPECIFIED ERECTILE DYSFUNCTION TYPE: ICD-10-CM

## 2018-07-23 DIAGNOSIS — E55.9 VITAMIN D DEFICIENCY: ICD-10-CM

## 2018-07-23 DIAGNOSIS — E55.9 VITAMIN D DEFICIENCY: Primary | ICD-10-CM

## 2018-07-23 DIAGNOSIS — R53.83 FATIGUE, UNSPECIFIED TYPE: ICD-10-CM

## 2018-07-23 LAB — 25(OH)D3 SERPL-MCNC: 19.6 NG/ML

## 2018-07-23 PROCEDURE — 99243 OFF/OP CNSLTJ NEW/EST LOW 30: CPT | Performed by: INTERNAL MEDICINE

## 2018-07-23 PROCEDURE — 99212 OFFICE O/P EST SF 10 MIN: CPT | Performed by: INTERNAL MEDICINE

## 2018-07-23 PROCEDURE — 84402 ASSAY OF FREE TESTOSTERONE: CPT

## 2018-07-23 PROCEDURE — 84403 ASSAY OF TOTAL TESTOSTERONE: CPT

## 2018-07-23 PROCEDURE — 36415 COLL VENOUS BLD VENIPUNCTURE: CPT

## 2018-07-23 PROCEDURE — 82306 VITAMIN D 25 HYDROXY: CPT

## 2018-07-23 NOTE — H&P
New Patient Evaluation - History and Physical    CONSULT - Reason for Visit:  Evaluation of testosterone levels. Requesting Physician: Dr. Kristyn Elizabeth:    Evaluation of testosterone levels.       HISTORY OF PRESENT ILLNESS:   Kylie Less name:                       Years of education:                 Number of children: 3             Occupational History  Occupation          Employer            Comment               Financial                                   Social History Main Topics lymphadenopathy  LUNGS: clear to auscultation bilaterally, no crackles or wheezing  CARDIOVASCULAR:  regular rate and rhythm, normal S1 and S2  ABDOMEN:  normal bowel sounds, soft, non-distended, non-tender  SKIN:  no bruising or bleeding, no rashes and no defined types were placed in this encounter.         7/23/2018  Raymundo Ferguson MD

## 2018-07-25 LAB
TESTOSTERONE, FREE, S: 7.39 NG/DL
TESTOSTERONE, TOTAL, S: 389 NG/DL

## 2018-07-27 ENCOUNTER — PATIENT MESSAGE (OUTPATIENT)
Dept: ENDOCRINOLOGY CLINIC | Facility: CLINIC | Age: 43
End: 2018-07-27

## 2018-07-27 DIAGNOSIS — E55.9 VITAMIN D DEFICIENCY: Primary | ICD-10-CM

## 2018-07-27 RX ORDER — ERGOCALCIFEROL (VITAMIN D2) 1250 MCG
CAPSULE ORAL
Qty: 12 CAPSULE | Refills: 0 | Status: SHIPPED | OUTPATIENT
Start: 2018-07-27 | End: 2018-11-09

## 2018-07-27 NOTE — TELEPHONE ENCOUNTER
From: Yeni Newman MD  To: Esequiel Davila  Sent: 7/27/2018 9:48 AM CDT  Subject: Test results    Hello,    Hope you are doing well. I received your results. Your testosterone levels are in the normal range.    Your vitamin D level is low at 19.6,

## 2018-08-22 ENCOUNTER — OFFICE VISIT (OUTPATIENT)
Dept: PODIATRY CLINIC | Facility: CLINIC | Age: 43
End: 2018-08-22
Payer: COMMERCIAL

## 2018-08-22 ENCOUNTER — HOSPITAL ENCOUNTER (OUTPATIENT)
Dept: GENERAL RADIOLOGY | Facility: HOSPITAL | Age: 43
Discharge: HOME | End: 2018-08-22
Attending: PODIATRIST
Payer: COMMERCIAL

## 2018-08-22 DIAGNOSIS — M79.671 PAIN OF RIGHT HEEL: Primary | ICD-10-CM

## 2018-08-22 DIAGNOSIS — M72.2 PLANTAR FASCIITIS OF RIGHT FOOT: ICD-10-CM

## 2018-08-22 DIAGNOSIS — M79.671 PAIN OF RIGHT HEEL: ICD-10-CM

## 2018-08-22 PROCEDURE — 99243 OFF/OP CNSLTJ NEW/EST LOW 30: CPT | Performed by: PODIATRIST

## 2018-08-22 PROCEDURE — 73630 X-RAY EXAM OF FOOT: CPT | Performed by: PODIATRIST

## 2018-08-22 PROCEDURE — 99212 OFFICE O/P EST SF 10 MIN: CPT | Performed by: PODIATRIST

## 2018-08-22 NOTE — PROGRESS NOTES
HPI:    Patient ID: Carlos Mabry is a 37year old male. HPI  This pleasant 25-year-old male presents as a new patient to me on consult from [address] Patient's chief complaint is right heel pain.   The pain is been present for the last few months I described and reviewed the etiology, progression, and considerations of care. He states that he is going to  a custom-made orthotic device this afternoon from his chiropractor.   I emphasized the importance of support, he will take ibuprofen 600 m

## 2018-09-24 ENCOUNTER — PATIENT MESSAGE (OUTPATIENT)
Dept: FAMILY MEDICINE CLINIC | Facility: CLINIC | Age: 43
End: 2018-09-24

## 2018-09-24 DIAGNOSIS — L40.9 PSORIASIS: Primary | ICD-10-CM

## 2018-09-24 NOTE — TELEPHONE ENCOUNTER
Doctor, please disregard, pt has PPO and I provided our website info on our [facility].

## 2018-09-24 NOTE — TELEPHONE ENCOUNTER
From: Zaria Reagan  To: Poncho Rivera MD  Sent: 9/24/2018 3:49 PM CDT  Subject: Referral Request    Hello Dr. Gotti Found you had a great weekend and start to the week.  I recently went to my dermatologist to get my psoriasis looked at as well a

## 2018-10-06 ENCOUNTER — HOSPITAL ENCOUNTER (OUTPATIENT)
Dept: GENERAL RADIOLOGY | Facility: HOSPITAL | Age: 43
Discharge: HOME | End: 2018-10-06
Attending: INTERNAL MEDICINE
Payer: COMMERCIAL

## 2018-10-06 ENCOUNTER — TELEPHONE (OUTPATIENT)
Dept: RHEUMATOLOGY | Facility: CLINIC | Age: 43
End: 2018-10-06

## 2018-10-06 ENCOUNTER — APPOINTMENT (OUTPATIENT)
Dept: LAB | Facility: HOSPITAL | Age: 43
End: 2018-10-06
Attending: INTERNAL MEDICINE
Payer: COMMERCIAL

## 2018-10-06 ENCOUNTER — OFFICE VISIT (OUTPATIENT)
Dept: RHEUMATOLOGY | Facility: CLINIC | Age: 43
End: 2018-10-06
Payer: COMMERCIAL

## 2018-10-06 VITALS
DIASTOLIC BLOOD PRESSURE: 69 MMHG | RESPIRATION RATE: 18 BRPM | SYSTOLIC BLOOD PRESSURE: 115 MMHG | WEIGHT: 228.38 LBS | HEIGHT: 73 IN | HEART RATE: 60 BPM | BODY MASS INDEX: 30.27 KG/M2

## 2018-10-06 DIAGNOSIS — L40.50 PSORIATIC ARTHRITIS (HCC): ICD-10-CM

## 2018-10-06 DIAGNOSIS — L40.9 PSORIASIS: ICD-10-CM

## 2018-10-06 DIAGNOSIS — L40.9 PSORIASIS: Primary | ICD-10-CM

## 2018-10-06 PROCEDURE — 85025 COMPLETE CBC W/AUTO DIFF WBC: CPT | Performed by: INTERNAL MEDICINE

## 2018-10-06 PROCEDURE — 86803 HEPATITIS C AB TEST: CPT | Performed by: INTERNAL MEDICINE

## 2018-10-06 PROCEDURE — 36415 COLL VENOUS BLD VENIPUNCTURE: CPT

## 2018-10-06 PROCEDURE — 86140 C-REACTIVE PROTEIN: CPT | Performed by: INTERNAL MEDICINE

## 2018-10-06 PROCEDURE — 99244 OFF/OP CNSLTJ NEW/EST MOD 40: CPT | Performed by: INTERNAL MEDICINE

## 2018-10-06 PROCEDURE — 85652 RBC SED RATE AUTOMATED: CPT | Performed by: INTERNAL MEDICINE

## 2018-10-06 PROCEDURE — 72202 X-RAY EXAM SI JOINTS 3/> VWS: CPT | Performed by: INTERNAL MEDICINE

## 2018-10-06 PROCEDURE — 86704 HEP B CORE ANTIBODY TOTAL: CPT | Performed by: INTERNAL MEDICINE

## 2018-10-06 PROCEDURE — 86706 HEP B SURFACE ANTIBODY: CPT | Performed by: INTERNAL MEDICINE

## 2018-10-06 PROCEDURE — 80053 COMPREHEN METABOLIC PANEL: CPT | Performed by: INTERNAL MEDICINE

## 2018-10-06 PROCEDURE — 86480 TB TEST CELL IMMUN MEASURE: CPT

## 2018-10-06 PROCEDURE — 73130 X-RAY EXAM OF HAND: CPT | Performed by: INTERNAL MEDICINE

## 2018-10-06 PROCEDURE — 99212 OFFICE O/P EST SF 10 MIN: CPT | Performed by: INTERNAL MEDICINE

## 2018-10-06 NOTE — PATIENT INSTRUCTIONS
1. You were seen today for psoriasis and arthritis  2. Plan to start [address] Common side effects are nausea, loose stool, weight loss and depression  3. You will need blood work before  4.  Dosing for General Reap is below    Day 1: 10 mg in AM  Day 2: 10 mg in A and flaxseed oil, canola oil, walnuts, soybean, and tofu are converted to omega-3 fatty acid in the body. · Stay at a healthy weight. Overlapping skin folds can be a site for psoriasis plaques.  If you are overweight, talk to your healthcare provider about [facility], [address]. All rights reserved. This information is not intended as a substitute for professional medical care. Always follow your healthcare professional's instructions.         Understanding Psoriatic Arthritis    Psoriatic art also be used to treat psoriatic arthritis. · Steroid injections into affected joints. This may help relieve symptoms. · Topical medicines for rough skin patches. These may relieve discomfort and dryness.   In addition to medicines, these treatments may be receiving this medicine?   Side effects that you should report to your doctor or health care professional as soon as possible:  · depressed mood  · weight loss  Side effects that usually do not require medical attention (report to your doctor or health care take this medicine. NOTE:This sheet is a summary. It may not cover all possible information. If you have questions about this medicine, talk to your doctor, pharmacist, or health care provider.  Copyright© 2018 [employer]

## 2018-10-06 NOTE — PROGRESS NOTES
Maryjane Yanes is a 37year old male who presents for Patient presents with:  Consult: New pt recommended by dermatologist  d/t joint pain on and off x 18 months. Vinnie Hercules    HPI:     I had the pleasure of seeing Maryjane Yanes on 10/6/2018 for evaluation of +enthesitis     Wt Readings from Last 2 Encounters:  10/06/18 : 228 lb 6.4 oz (103.6 kg)  07/23/18 : 236 lb (107 kg)    Body mass index is 30.13 kg/m².         Current Outpatient Medications:  ergocalciferol 39724 units Oral Cap Take once capsule once a wee no heart disease  RS: No SOB, no Cough, No Pleurtic pain,   GI: No nausea, no vomiiting, no abominal pain, no hx of ulcer, no gastritis, no heartburn, no dyshpagia, no BRBPR or melena  : no dysuria, no hx of miscarriages, no DVT Hx, no hx of OCP,   Neuro Total      22 - 32 mmol/L 27   BUN      8 - 20 mg/dL 14   CREATININE      0.50 - 1.50 mg/dL 0.95   CALCIUM      8.5 - 10.5 mg/dL 9.3   ALT (SGPT)      17 - 63 U/L 32   AST (SGOT)      15 - 41 U/L 29   ALKALINE PHOSPHATASE      32 - 100 U/L 59   Total Bilir presents psoriasis with worsening joint pain. He has had psoriasis for the past 20 years and is currently on a betamethasone cream that helps. Has never been on oral or injectable medications.   Over the past year he has had more joint pain involving his

## 2018-10-08 NOTE — TELEPHONE ENCOUNTER
Contacted [employer] to initiate PA for [employer]. PA form to be faxed to [facility] office.

## 2018-10-12 NOTE — TELEPHONE ENCOUNTER
Dr. Heather Henning- please verify and sign Fredi Sable script (titration and maintenance dose) which has been updated with specialty pharmacy.

## 2018-10-15 ENCOUNTER — TELEPHONE (OUTPATIENT)
Dept: RHEUMATOLOGY | Facility: CLINIC | Age: 43
End: 2018-10-15

## 2018-10-15 NOTE — TELEPHONE ENCOUNTER
Contacted [address] with PA information per 10/6 encounter. Per representative, Clifford Saldaña, updated PA request (#60 for 30 days) was removed due to duplicate PA on file. Requested that updated PA (#60 for 30 days) be processed.  Per representative, jean pierre

## 2018-10-15 NOTE — TELEPHONE ENCOUNTER
[facility] contacted and PA is valid for Otezla titration and for maintenance dose. Spoke with [place] and both scripts are on file from 10/12. Advised pharmacy per insurance titration will have to be ran first then maintenance.  Per pharmacy pt has a zero doll

## 2018-10-15 NOTE — TELEPHONE ENCOUNTER
Patient states that [facility] needs prescription and confirmation of approved PA for     Apremilast (OTEZLA) 10 & 20 & 30 MG Oral Tablet Therapy Pack Take started titration pack as directed then continue maintenance dose 30 mg BID Disp: 1 each Rfl: 0   Ap

## 2018-10-16 ENCOUNTER — MYAURORA ACCOUNT LINK (OUTPATIENT)
Dept: OTHER | Age: 43
End: 2018-10-16

## 2018-10-16 ENCOUNTER — PATIENT MESSAGE (OUTPATIENT)
Dept: ENDOCRINOLOGY CLINIC | Facility: CLINIC | Age: 43
End: 2018-10-16

## 2018-10-16 NOTE — TELEPHONE ENCOUNTER
From: Carlos Mabry  To: Raffaele Ordonez MD  Sent: 10/16/2018 3:07 PM CDT  Subject: Visit Andres Mayers you are having a great week.  Unfortunately I was not able to make it to [facility] to get the blood wor

## 2018-10-18 ENCOUNTER — APPOINTMENT (OUTPATIENT)
Dept: LAB | Age: 43
End: 2018-10-18
Attending: INTERNAL MEDICINE
Payer: COMMERCIAL

## 2018-10-18 DIAGNOSIS — E55.9 VITAMIN D DEFICIENCY: ICD-10-CM

## 2018-10-18 PROCEDURE — 82306 VITAMIN D 25 HYDROXY: CPT

## 2018-10-18 PROCEDURE — 36415 COLL VENOUS BLD VENIPUNCTURE: CPT

## 2018-10-19 ENCOUNTER — TELEPHONE (OUTPATIENT)
Dept: ENDOCRINOLOGY CLINIC | Facility: CLINIC | Age: 43
End: 2018-10-19

## 2018-10-19 NOTE — TELEPHONE ENCOUNTER
Vit D is normal  He was started on weekly replacement for low Vit D  SInce it is normal now.  He can stop the ergocalciferol 5000 q wee, can switch to OTC Vit D 5538-6530 units daily  FU with PCP  Thanks

## 2018-10-19 NOTE — TELEPHONE ENCOUNTER
Spoke with patient and advised of below. He will switch to OTC vitamin D 0684-6791 units daily and will follow up with PCP.

## 2018-11-09 ENCOUNTER — OFFICE VISIT (OUTPATIENT)
Dept: RHEUMATOLOGY | Facility: CLINIC | Age: 43
End: 2018-11-09
Payer: COMMERCIAL

## 2018-11-09 VITALS
HEIGHT: 73 IN | WEIGHT: 228.69 LBS | DIASTOLIC BLOOD PRESSURE: 63 MMHG | SYSTOLIC BLOOD PRESSURE: 123 MMHG | BODY MASS INDEX: 30.31 KG/M2 | HEART RATE: 59 BPM

## 2018-11-09 DIAGNOSIS — L40.50 PSORIATIC ARTHRITIS (HCC): Primary | ICD-10-CM

## 2018-11-09 DIAGNOSIS — L40.9 PSORIASIS: ICD-10-CM

## 2018-11-09 PROCEDURE — 99213 OFFICE O/P EST LOW 20 MIN: CPT | Performed by: INTERNAL MEDICINE

## 2018-11-09 PROCEDURE — 99212 OFFICE O/P EST SF 10 MIN: CPT | Performed by: INTERNAL MEDICINE

## 2018-11-09 RX ORDER — CALCIPOTRIENE, BETAMETHASONE DIPROPIONATE 50; .643 UG/G; MG/G
OINTMENT TOPICAL AS NEEDED
Refills: 2 | COMMUNITY
Start: 2018-09-27 | End: 2021-06-11 | Stop reason: ALTCHOICE

## 2018-11-09 RX ORDER — MULTIVIT-MIN/IRON/FOLIC ACID/K 18-600-40
CAPSULE ORAL DAILY
COMMUNITY

## 2018-11-09 NOTE — PROGRESS NOTES
Mikki Smith is a 37year old male. HPI:   Patient presents with:  Psoriasis  Foot Pain: right    I had the pleasure of seeing Mikki Smith on 11/9/2018 for follow up psoriasis and psoriatic arthritis. He was last seen on 10/6/18.      Current Me Medications:  Cholecalciferol (VITAMIN D) 2000 units Oral Cap Take by mouth daily. Disp:  Rfl:    Apremilast (OTEZLA) 30 MG Oral Tab Take 30 mg by mouth 2 (two) times daily. Disp: 60 tablet Rfl: 4   aspirin 81 MG Oral Tab Take 81 mg by mouth daily.  Disp: palpation with FROM  Spine: no lumbar or sacral pain on palpation. NEURO: Cranial nerves II-XII intact grossly. 5/5 strength throughout in both upper and lower extremities, sensation intact.   PSYCH: normal mood       LABS:     Component      Latest Ref Rn Vitamin D, 25OH, Total      30.0 - 100.0 ng/mL 34.5        Imaging:     XR b/l hands: normal    XR SI joints: normal    XR R foot:   1. Minimal degenerative changes first metatarsal head. 2. Tiny plantar spur.   3. Suggestive of pes planus on lateral vie

## 2018-11-27 ENCOUNTER — PATIENT MESSAGE (OUTPATIENT)
Dept: RHEUMATOLOGY | Facility: CLINIC | Age: 43
End: 2018-11-27

## 2018-11-28 NOTE — TELEPHONE ENCOUNTER
From: Mark Bowles Mitchell  To: Marleni Mcknight MD  Sent: 11/27/2018 10:46 AM CST  Subject: Prescription Question    [facility] Dr. Mariana Negro you had a great Thanksgiving weekend.  Just as an FYI, prior to going on [place] I had a pretty significant infection fro

## 2018-11-28 NOTE — PROGRESS NOTES
Returned patient's phone call. Patient was started arthritis recently started on Otezla. Prior to starting, he had a tooth abscess that was 6 mm in size and treated with amoxicillin.   He has recurrence of the tooth abscess in the same location and there

## 2018-12-23 ENCOUNTER — HOSPITAL ENCOUNTER (OUTPATIENT)
Age: 43
Discharge: HOME | End: 2018-12-23
Attending: FAMILY MEDICINE
Payer: COMMERCIAL

## 2018-12-23 VITALS
HEART RATE: 82 BPM | TEMPERATURE: 99 F | RESPIRATION RATE: 22 BRPM | DIASTOLIC BLOOD PRESSURE: 72 MMHG | SYSTOLIC BLOOD PRESSURE: 150 MMHG | WEIGHT: 225 LBS | BODY MASS INDEX: 29.82 KG/M2 | HEIGHT: 73 IN | OXYGEN SATURATION: 97 %

## 2018-12-23 DIAGNOSIS — J06.9 UPPER RESPIRATORY TRACT INFECTION, UNSPECIFIED TYPE: Primary | ICD-10-CM

## 2018-12-23 PROCEDURE — 99214 OFFICE O/P EST MOD 30 MIN: CPT

## 2018-12-23 PROCEDURE — 99213 OFFICE O/P EST LOW 20 MIN: CPT

## 2018-12-23 RX ORDER — AZITHROMYCIN 250 MG/1
TABLET, FILM COATED ORAL
Qty: 1 PACKAGE | Refills: 0 | Status: SHIPPED | OUTPATIENT
Start: 2018-12-23 | End: 2018-12-28

## 2018-12-23 NOTE — ED NOTES
All orders complete pt leaving IC stable no acute distress noted RX given and explained pt verbalizes DC and fopllow up instructions

## 2018-12-23 NOTE — ED PROVIDER NOTES
[address]    History   Patient presents with:  Cough/URI    Stated Complaint: COUGH Afsaneh Delcid    HPI    Pt is a 36 yo with a 2 week h/o cough and congestion and low grade fevers.     Past Medical H and oriented to person, place, and time. Skin: Skin is warm. Capillary refill takes less than 2 seconds. Psychiatric: He has a normal mood and affect. His behavior is normal.   Nursing note and vitals reviewed.           ED Course   Labs Reviewed - No d

## 2018-12-28 ENCOUNTER — LAB ENCOUNTER (OUTPATIENT)
Dept: LAB | Facility: HOSPITAL | Age: 43
End: 2018-12-28
Attending: INTERNAL MEDICINE
Payer: COMMERCIAL

## 2018-12-28 ENCOUNTER — PRIOR ORIGINAL RECORDS (OUTPATIENT)
Dept: OTHER | Age: 43
End: 2018-12-28

## 2018-12-28 ENCOUNTER — OFFICE VISIT (OUTPATIENT)
Dept: RHEUMATOLOGY | Facility: CLINIC | Age: 43
End: 2018-12-28
Payer: COMMERCIAL

## 2018-12-28 VITALS
HEART RATE: 64 BPM | HEIGHT: 73 IN | DIASTOLIC BLOOD PRESSURE: 74 MMHG | SYSTOLIC BLOOD PRESSURE: 125 MMHG | WEIGHT: 222 LBS | BODY MASS INDEX: 29.42 KG/M2

## 2018-12-28 DIAGNOSIS — L40.9 PSORIASIS: ICD-10-CM

## 2018-12-28 DIAGNOSIS — L40.50 PSORIATIC ARTHRITIS (HCC): ICD-10-CM

## 2018-12-28 DIAGNOSIS — L40.50 PSORIATIC ARTHRITIS (HCC): Primary | ICD-10-CM

## 2018-12-28 PROCEDURE — 99213 OFFICE O/P EST LOW 20 MIN: CPT | Performed by: INTERNAL MEDICINE

## 2018-12-28 PROCEDURE — 36415 COLL VENOUS BLD VENIPUNCTURE: CPT

## 2018-12-28 PROCEDURE — 85025 COMPLETE CBC W/AUTO DIFF WBC: CPT

## 2018-12-28 PROCEDURE — 80053 COMPREHEN METABOLIC PANEL: CPT

## 2018-12-28 PROCEDURE — 99212 OFFICE O/P EST SF 10 MIN: CPT | Performed by: INTERNAL MEDICINE

## 2018-12-28 NOTE — PROGRESS NOTES
Bridgette Munoz is a 37year old male. HPI:   Patient presents with:  Psoriasis  Psoriatic Arthritis    I had the pleasure of seeing Bridgette Munoz on 12/28/2018 for follow up Psoriasis and PsA.      Current Medications:  Otezla 30 mg twice day- s Ointment as needed. Disp:  Rfl: 2   triamcinolone acetonide 0.1 % External Cream Apply topically 2 (two) times daily as needed. Disp: 60 g Rfl: 0   Multiple Vitamin (MULTI VITAMIN MENS) Oral Tab Take 1 tablet by mouth daily.  Disp:  Rfl:      .cmed  Allergi tendinitis)  - Patient was started on Otezla 30 mg bid significant improvement in his joint pain.  - Rash has improved on the soles of his feet and L elbow, still has a plaque on the R elbow  - Continue Otezla for now  - For intermittent joint pain take ibu

## 2018-12-29 ENCOUNTER — APPOINTMENT (OUTPATIENT)
Dept: GENERAL RADIOLOGY | Facility: HOSPITAL | Age: 43
End: 2018-12-29
Attending: EMERGENCY MEDICINE
Payer: COMMERCIAL

## 2018-12-29 ENCOUNTER — HOSPITAL ENCOUNTER (EMERGENCY)
Facility: HOSPITAL | Age: 43
Discharge: HOME | End: 2018-12-29
Attending: EMERGENCY MEDICINE
Payer: COMMERCIAL

## 2018-12-29 VITALS
RESPIRATION RATE: 16 BRPM | OXYGEN SATURATION: 97 % | HEART RATE: 50 BPM | WEIGHT: 220 LBS | SYSTOLIC BLOOD PRESSURE: 131 MMHG | BODY MASS INDEX: 29.16 KG/M2 | DIASTOLIC BLOOD PRESSURE: 72 MMHG | HEIGHT: 73 IN | TEMPERATURE: 97 F

## 2018-12-29 DIAGNOSIS — J06.9 VIRAL UPPER RESPIRATORY TRACT INFECTION: Primary | ICD-10-CM

## 2018-12-29 PROCEDURE — 99284 EMERGENCY DEPT VISIT MOD MDM: CPT

## 2018-12-29 PROCEDURE — 71046 X-RAY EXAM CHEST 2 VIEWS: CPT | Performed by: EMERGENCY MEDICINE

## 2018-12-29 PROCEDURE — 93005 ELECTROCARDIOGRAM TRACING: CPT

## 2018-12-29 PROCEDURE — 93010 ELECTROCARDIOGRAM REPORT: CPT | Performed by: EMERGENCY MEDICINE

## 2018-12-29 NOTE — ED NOTES
Pt presents to ED with congest for 1 month. Pt states he always has a chronic cough and congestion. Pt denies any green nasal discharge. Pt states he has been on a z pack. Pt denies any pain or fevers.  Pt he was on a bike ride this morning and felt nauseou

## 2018-12-29 NOTE — ED PROVIDER NOTES
Patient Seen in: [facility]    History   No chief complaint on file. Stated Complaint: congestion     HPI    The patient is a 69-year-old male who presents with 1 month of mild cough and nasal congestion.   No fevers or chil 12/29/18 0808 50   Resp 12/29/18 0808 16   Temp 12/29/18 0815 97.2 °F (36.2 °C)   Temp src 12/29/18 0815 Temporal   SpO2 12/29/18 0808 95 %   O2 Device 12/29/18 0818 None (Room air)       Current:/72   Pulse 50   Temp 97.2 °F (36.2 °C) (Temporal)   R versus upper respiratory infection/allergies. Patient comfortable with discharge plan and understands to return if symptoms worsen.       MDM   Pulse Ox: 97%, Normal, Room air    Cardiac Monitor: Pulse Readings from Last 1 Encounters:  12/29/18 : 50  , sin

## 2019-01-23 LAB
BILIRUBIN TOTAL: 0.9 MG/DL
BUN: 15 MG/DL
CALCIUM: 9.1 MG/DL
CHLORIDE: 103 MEQ/L
CREATININE, SERUM: 0.93 MG/DL
GLUCOSE: 80 MG/DL
HEMATOCRIT: 46.2 %
HEMOGLOBIN: 15.5 G/DL
PLATELETS: 239 K/UL
POTASSIUM, SERUM: 4.4 MEQ/L
PROTEIN, TOTAL: 6.9 G/DL
RED BLOOD COUNT: 5.22 X 10-6/U
SGOT (AST): 23 IU/L
SGPT (ALT): 20 IU/L
SODIUM: 137 MEQ/L
WHITE BLOOD COUNT: 5.7 X 10-3/U

## 2019-01-25 ENCOUNTER — MYAURORA ACCOUNT LINK (OUTPATIENT)
Dept: OTHER | Age: 44
End: 2019-01-25

## 2019-01-25 ENCOUNTER — PRIOR ORIGINAL RECORDS (OUTPATIENT)
Dept: OTHER | Age: 44
End: 2019-01-25

## 2019-02-15 ENCOUNTER — PATIENT MESSAGE (OUTPATIENT)
Dept: RHEUMATOLOGY | Facility: CLINIC | Age: 44
End: 2019-02-15

## 2019-02-15 NOTE — TELEPHONE ENCOUNTER
From: Asia [place]  To: Franchesca Lanier MD  Sent: 2/15/2019 10:58 AM CST  Subject: Monica Mendez Caro,    Quick update, I was able to see my Endodontist this morning and he noted that there doesn't appear to any infection around the tooth and that t

## 2019-02-15 NOTE — PROGRESS NOTES
Discussed with patient. He will see how he does over the weekend and if symptoms cont may stop otezla for 1-2 months.

## 2019-02-26 ENCOUNTER — OFFICE VISIT (OUTPATIENT)
Dept: PULMONOLOGY | Facility: CLINIC | Age: 44
End: 2019-02-26
Payer: COMMERCIAL

## 2019-02-26 ENCOUNTER — APPOINTMENT (OUTPATIENT)
Dept: LAB | Facility: HOSPITAL | Age: 44
End: 2019-02-26
Attending: INTERNAL MEDICINE
Payer: COMMERCIAL

## 2019-02-26 VITALS
HEIGHT: 73 IN | DIASTOLIC BLOOD PRESSURE: 82 MMHG | HEART RATE: 53 BPM | RESPIRATION RATE: 14 BRPM | OXYGEN SATURATION: 98 % | SYSTOLIC BLOOD PRESSURE: 149 MMHG | WEIGHT: 225.63 LBS | BODY MASS INDEX: 29.9 KG/M2

## 2019-02-26 DIAGNOSIS — R06.00 DYSPNEA ON EXERTION: ICD-10-CM

## 2019-02-26 DIAGNOSIS — R06.00 DYSPNEA ON EXERTION: Primary | ICD-10-CM

## 2019-02-26 PROBLEM — R06.09 DYSPNEA ON EXERTION: Status: ACTIVE | Noted: 2019-02-26

## 2019-02-26 LAB
D DIMER PPP FEU-MCNC: 0.34 MCG/ML (ref ?–0.5)
NT-PROBNP SERPL-MCNC: 1344 PG/ML (ref ?–125)
TSI SER-ACNC: 2.25 MIU/ML (ref 0.36–3.74)

## 2019-02-26 PROCEDURE — 83880 ASSAY OF NATRIURETIC PEPTIDE: CPT

## 2019-02-26 PROCEDURE — 36415 COLL VENOUS BLD VENIPUNCTURE: CPT

## 2019-02-26 PROCEDURE — 99243 OFF/OP CNSLTJ NEW/EST LOW 30: CPT | Performed by: INTERNAL MEDICINE

## 2019-02-26 PROCEDURE — 99212 OFFICE O/P EST SF 10 MIN: CPT | Performed by: INTERNAL MEDICINE

## 2019-02-26 PROCEDURE — 84443 ASSAY THYROID STIM HORMONE: CPT

## 2019-02-26 PROCEDURE — 85379 FIBRIN DEGRADATION QUANT: CPT

## 2019-02-26 RX ORDER — ALBUTEROL SULFATE 90 UG/1
AEROSOL, METERED RESPIRATORY (INHALATION)
Qty: 1 INHALER | Refills: 5 | Status: SHIPPED | OUTPATIENT
Start: 2019-02-26 | End: 2019-12-26

## 2019-02-26 NOTE — PROGRESS NOTES
Dear  Alondra Roa  :           As you know, Kaitlin Case is a 30-year-old male who I am now evaluating for mild uncomfortable awareness of his breathing with running. HISTORY OF PRESENT ILLNESS: The patient has a history of distance rounds.   He does Spartan once a intact with symmetric tone and strength and reflex. LABORATORY: Chest x-ray–unremarkable    ASSESSMENT AND PLAN:  PROBLEM 1.   Dyspnea with running–my strong suspicion is that the patient has a post viral airway hyperreactivity after the bronchitic episo

## 2019-02-28 VITALS
HEART RATE: 71 BPM | HEIGHT: 73 IN | DIASTOLIC BLOOD PRESSURE: 76 MMHG | OXYGEN SATURATION: 97 % | SYSTOLIC BLOOD PRESSURE: 120 MMHG | RESPIRATION RATE: 20 BRPM | WEIGHT: 248 LBS | BODY MASS INDEX: 32.87 KG/M2

## 2019-02-28 VITALS
DIASTOLIC BLOOD PRESSURE: 62 MMHG | OXYGEN SATURATION: 98 % | HEART RATE: 73 BPM | WEIGHT: 243 LBS | BODY MASS INDEX: 32.91 KG/M2 | RESPIRATION RATE: 18 BRPM | HEIGHT: 72 IN | SYSTOLIC BLOOD PRESSURE: 130 MMHG

## 2019-02-28 VITALS
SYSTOLIC BLOOD PRESSURE: 122 MMHG | DIASTOLIC BLOOD PRESSURE: 60 MMHG | HEIGHT: 73 IN | HEART RATE: 56 BPM | BODY MASS INDEX: 29.55 KG/M2 | WEIGHT: 223 LBS | OXYGEN SATURATION: 98 %

## 2019-02-28 VITALS — DIASTOLIC BLOOD PRESSURE: 72 MMHG | WEIGHT: 260 LBS | HEART RATE: 72 BPM | SYSTOLIC BLOOD PRESSURE: 132 MMHG

## 2019-03-07 ENCOUNTER — TELEPHONE (OUTPATIENT)
Dept: PULMONOLOGY | Facility: CLINIC | Age: 44
End: 2019-03-07

## 2019-03-07 DIAGNOSIS — R06.00 DYSPNEA ON EXERTION: Primary | ICD-10-CM

## 2019-03-13 ENCOUNTER — HOSPITAL ENCOUNTER (OUTPATIENT)
Dept: CV DIAGNOSTICS | Facility: HOSPITAL | Age: 44
Discharge: HOME | End: 2019-03-13
Attending: INTERNAL MEDICINE
Payer: COMMERCIAL

## 2019-03-13 DIAGNOSIS — R06.00 DYSPNEA ON EXERTION: ICD-10-CM

## 2019-03-13 PROCEDURE — 93306 TTE W/DOPPLER COMPLETE: CPT | Performed by: INTERNAL MEDICINE

## 2019-03-19 ENCOUNTER — OFFICE VISIT (OUTPATIENT)
Dept: CARDIOLOGY | Age: 44
End: 2019-03-19

## 2019-03-19 ENCOUNTER — TELEPHONE (OUTPATIENT)
Dept: CARDIOLOGY | Age: 44
End: 2019-03-19

## 2019-03-19 VITALS
SYSTOLIC BLOOD PRESSURE: 116 MMHG | BODY MASS INDEX: 28.76 KG/M2 | WEIGHT: 217 LBS | DIASTOLIC BLOOD PRESSURE: 72 MMHG | OXYGEN SATURATION: 96 % | HEIGHT: 73 IN | HEART RATE: 53 BPM

## 2019-03-19 DIAGNOSIS — R55 SYNCOPE AND COLLAPSE: Primary | ICD-10-CM

## 2019-03-19 DIAGNOSIS — R93.1 ABNORMAL ECHOCARDIOGRAM: ICD-10-CM

## 2019-03-19 DIAGNOSIS — R00.2 PALPITATIONS: ICD-10-CM

## 2019-03-19 PROCEDURE — 99214 OFFICE O/P EST MOD 30 MIN: CPT | Performed by: INTERNAL MEDICINE

## 2019-03-19 PROCEDURE — X1094 ELECTROCARDIOGRAM 12-LEAD: HCPCS | Performed by: INTERNAL MEDICINE

## 2019-03-19 PROCEDURE — 93000 ELECTROCARDIOGRAM COMPLETE: CPT | Performed by: INTERNAL MEDICINE

## 2019-03-19 RX ORDER — CALCIPOTRIENE, BETAMETHASONE DIPROPIONATE 50; .643 UG/G; MG/G
OINTMENT TOPICAL
COMMUNITY
Start: 2018-09-27

## 2019-03-19 RX ORDER — FLUTICASONE FUROATE AND VILANTEROL 100; 25 UG/1; UG/1
1 POWDER RESPIRATORY (INHALATION)
COMMUNITY
Start: 2019-02-26 | End: 2019-04-09 | Stop reason: CLARIF

## 2019-03-19 RX ORDER — ALBUTEROL SULFATE 90 UG/1
AEROSOL, METERED RESPIRATORY (INHALATION)
COMMUNITY
Start: 2019-02-26

## 2019-03-19 RX ORDER — TRIAMCINOLONE ACETONIDE 1 MG/G
CREAM TOPICAL
COMMUNITY
Start: 2018-05-03 | End: 2019-04-09 | Stop reason: CLARIF

## 2019-03-19 RX ORDER — HYDROCODONE BITARTRATE AND ACETAMINOPHEN 5; 325 MG/1; MG/1
TABLET ORAL
Refills: 0 | COMMUNITY
Start: 2019-02-16 | End: 2019-04-09 | Stop reason: CLARIF

## 2019-03-19 RX ORDER — MULTIVIT-MIN/IRON/FOLIC ACID/K 18-600-40
CAPSULE ORAL
COMMUNITY

## 2019-03-19 RX ORDER — ASPIRIN 81 MG/1
81 TABLET, CHEWABLE ORAL
COMMUNITY
Start: 2018-01-19

## 2019-03-19 RX ORDER — FLUDROCORTISONE ACETATE 0.1 MG/1
0.1 TABLET ORAL
COMMUNITY
Start: 2017-11-21 | End: 2019-11-26

## 2019-03-19 RX ORDER — IBUPROFEN 200 MG
10000 TABLET ORAL
COMMUNITY
End: 2019-03-19 | Stop reason: CLARIF

## 2019-03-20 PROCEDURE — 93298 REM INTERROG DEV EVAL SCRMS: CPT | Performed by: INTERNAL MEDICINE

## 2019-03-20 PROCEDURE — 93299 CHECK IMPLANT CARDIO OR SUBQ RHYTHM MNTR REMOTE UP TO 30 DAY TECH EVAL: CPT | Performed by: INTERNAL MEDICINE

## 2019-03-25 ENCOUNTER — APPOINTMENT (OUTPATIENT)
Dept: GENERAL RADIOLOGY | Age: 44
End: 2019-03-25
Attending: FAMILY MEDICINE
Payer: COMMERCIAL

## 2019-03-25 ENCOUNTER — HOSPITAL ENCOUNTER (OUTPATIENT)
Age: 44
Discharge: HOME | End: 2019-03-25
Attending: FAMILY MEDICINE
Payer: COMMERCIAL

## 2019-03-25 VITALS
HEIGHT: 73 IN | DIASTOLIC BLOOD PRESSURE: 63 MMHG | WEIGHT: 215 LBS | SYSTOLIC BLOOD PRESSURE: 142 MMHG | OXYGEN SATURATION: 100 % | BODY MASS INDEX: 28.49 KG/M2 | RESPIRATION RATE: 18 BRPM | HEART RATE: 59 BPM | TEMPERATURE: 98 F

## 2019-03-25 DIAGNOSIS — J01.00 ACUTE NON-RECURRENT MAXILLARY SINUSITIS: Primary | ICD-10-CM

## 2019-03-25 PROCEDURE — 99213 OFFICE O/P EST LOW 20 MIN: CPT

## 2019-03-25 PROCEDURE — 99214 OFFICE O/P EST MOD 30 MIN: CPT

## 2019-03-25 PROCEDURE — 71046 X-RAY EXAM CHEST 2 VIEWS: CPT | Performed by: FAMILY MEDICINE

## 2019-03-25 RX ORDER — AMOXICILLIN AND CLAVULANATE POTASSIUM 875; 125 MG/1; MG/1
1 TABLET, FILM COATED ORAL 2 TIMES DAILY
Qty: 14 TABLET | Refills: 0 | Status: SHIPPED | OUTPATIENT
Start: 2019-03-25 | End: 2019-04-01

## 2019-03-25 NOTE — ED PROVIDER NOTES
Patient Seen in: [address]    History   Patient presents with:  Cough/URI    Stated Complaint: cough     HPI      HISTORY OF PRESENT ILLNESS:Patient complains of URI symptoms for 14 days.   Complains of sinus congestion, Oral Tab,  Take 10,000 mg by mouth as needed for Pain.          Family History   Problem Relation Age of Onset   • Diabetes Father    • Diabetes Paternal Grandfather        Social History    Tobacco Use      Smoking status: Never Smoker      Smokeless tobac MEDIAST/SEBAS:             No visible mass or adenopathy. LUNGS/PLEURA:           Normal.  No significant pulmonary parenchymal abnormalities. No effusion or pleural thickening. BONES:             No fracture or visible bony lesion.    OTHER:

## 2019-03-25 NOTE — ED INITIAL ASSESSMENT (HPI)
Pt states starting with a runny nose that has been happening for 2 weeks. Pt states this Saturday began developing a cough with chest congestion. Pt denies fever at this time but states having chills sometimes. Pt denies any trouble swallowing or drinking.

## 2019-03-27 ENCOUNTER — TELEPHONE (OUTPATIENT)
Dept: PULMONOLOGY | Facility: CLINIC | Age: 44
End: 2019-03-27

## 2019-03-27 RX ORDER — AZITHROMYCIN 250 MG/1
TABLET, FILM COATED ORAL
Qty: 6 TABLET | Refills: 0 | Status: SHIPPED | OUTPATIENT
Start: 2019-03-27 | End: 2019-04-12 | Stop reason: ALTCHOICE

## 2019-04-03 ENCOUNTER — HOSPITAL ENCOUNTER (OUTPATIENT)
Dept: MRI IMAGING | Facility: HOSPITAL | Age: 44
Discharge: HOME | End: 2019-04-03
Attending: INTERNAL MEDICINE
Payer: COMMERCIAL

## 2019-04-03 DIAGNOSIS — R93.1 ABNORMAL ECHOCARDIOGRAM: ICD-10-CM

## 2019-04-03 DIAGNOSIS — R00.2 PALPITATIONS: ICD-10-CM

## 2019-04-03 DIAGNOSIS — R55 SYNCOPE AND COLLAPSE: ICD-10-CM

## 2019-04-03 PROCEDURE — 75561 CARDIAC MRI FOR MORPH W/DYE: CPT | Performed by: INTERNAL MEDICINE

## 2019-04-03 PROCEDURE — A9575 INJ GADOTERATE MEGLUMI 0.1ML: HCPCS | Performed by: INTERNAL MEDICINE

## 2019-04-05 ENCOUNTER — TELEPHONE (OUTPATIENT)
Dept: CARDIOLOGY | Age: 44
End: 2019-04-05

## 2019-04-08 PROBLEM — R00.1 BRADYCARDIA: Status: ACTIVE | Noted: 2019-04-08

## 2019-04-08 PROBLEM — I48.91 FIBRILLATION, ATRIAL (CMD): Status: ACTIVE | Noted: 2019-04-08

## 2019-04-09 ENCOUNTER — OFFICE VISIT (OUTPATIENT)
Dept: CARDIOLOGY | Age: 44
End: 2019-04-09

## 2019-04-09 VITALS
BODY MASS INDEX: 28.49 KG/M2 | WEIGHT: 215 LBS | DIASTOLIC BLOOD PRESSURE: 64 MMHG | HEART RATE: 60 BPM | SYSTOLIC BLOOD PRESSURE: 142 MMHG | HEIGHT: 73 IN

## 2019-04-09 DIAGNOSIS — I48.0 PAROXYSMAL ATRIAL FIBRILLATION (CMD): ICD-10-CM

## 2019-04-09 DIAGNOSIS — I42.1 HOCM (HYPERTROPHIC OBSTRUCTIVE CARDIOMYOPATHY) (CMD): Primary | ICD-10-CM

## 2019-04-09 DIAGNOSIS — R55 SYNCOPE AND COLLAPSE: ICD-10-CM

## 2019-04-09 PROCEDURE — 99214 OFFICE O/P EST MOD 30 MIN: CPT | Performed by: INTERNAL MEDICINE

## 2019-04-11 DIAGNOSIS — R55 SYNCOPE AND COLLAPSE: ICD-10-CM

## 2019-04-11 DIAGNOSIS — R93.1 ABNORMAL ECHOCARDIOGRAM: ICD-10-CM

## 2019-04-11 DIAGNOSIS — R00.2 PALPITATIONS: ICD-10-CM

## 2019-04-12 ENCOUNTER — OFFICE VISIT (OUTPATIENT)
Dept: RHEUMATOLOGY | Facility: CLINIC | Age: 44
End: 2019-04-12
Payer: COMMERCIAL

## 2019-04-12 ENCOUNTER — TELEPHONE (OUTPATIENT)
Dept: CARDIOLOGY | Age: 44
End: 2019-04-12

## 2019-04-12 VITALS
DIASTOLIC BLOOD PRESSURE: 79 MMHG | SYSTOLIC BLOOD PRESSURE: 143 MMHG | HEART RATE: 56 BPM | BODY MASS INDEX: 29.36 KG/M2 | HEIGHT: 73 IN | WEIGHT: 221.5 LBS

## 2019-04-12 DIAGNOSIS — L40.50 PSORIATIC ARTHRITIS (HCC): Primary | ICD-10-CM

## 2019-04-12 DIAGNOSIS — L40.9 PSORIASIS: ICD-10-CM

## 2019-04-12 PROCEDURE — 99212 OFFICE O/P EST SF 10 MIN: CPT | Performed by: INTERNAL MEDICINE

## 2019-04-12 PROCEDURE — 99213 OFFICE O/P EST LOW 20 MIN: CPT | Performed by: INTERNAL MEDICINE

## 2019-04-12 NOTE — PROGRESS NOTES
Ekta Trevino is a 40year old male. HPI:   Patient presents with:  Psoriatic Arthritis    I had the pleasure of seeing Ekta Trevino on 4/4/2019 for follow up Psoriasis and PsA.      Current Medications:  Otezla 30 mg twice day- started 10/201 Hypertrophy of nasal turbinates    • Hypertrophy of tonsil    • Tendonitis of knee, left       Social Hx Reviewed   Family Hx Reviewed     Medications (Active prior to today's visit):    Current Outpatient Medications:  Albuterol Sulfate  (90 Base) swelling or warmth on palpation  Shoulders: FROM, no pain or swelling or warmth on palpation  Hip: normal log roll, no lateral hip pain, JUSTIN test negative b/l  Knees: FROM, no warmth or effusion present. No pain with ROM.    Ankles: FROM, no pain or swell Absolute      1.8 - 7.7 K/UL 2.8   Lymphocytes Absolute      1.0 - 4.0 K/UL 2.2   Monocytes Absolute      0.0 - 1.0 K/UL 0.6   Eosinophils Absolute      0.0 - 0.7 K/UL 0.1   Basophils Absolute      0.0 - 0.2 K/UL 0.0     Component      Latest Ref Rng & Uni Denice Zelaya.  Tooth has been extracted and he is stable  - Joint pain in his knees and ankles have reappeared and also his psoriatic lesions on his elbows and soles of the feet have slightly worsened  - Plan to restart Otezla 30 mg BID  - For intermittent joint

## 2019-04-12 NOTE — PATIENT INSTRUCTIONS
- you were seen today for psoriasis and arthritis  - restart the otezla, any issues with tooth infection d/c and we can then discuss the injections  - blood work in July

## 2019-04-20 PROCEDURE — 93299 CHECK IMPLANT CARDIO OR SUBQ RHYTHM MNTR REMOTE UP TO 30 DAY TECH EVAL: CPT | Performed by: INTERNAL MEDICINE

## 2019-04-20 PROCEDURE — 93298 REM INTERROG DEV EVAL SCRMS: CPT | Performed by: INTERNAL MEDICINE

## 2019-05-21 PROCEDURE — 93298 REM INTERROG DEV EVAL SCRMS: CPT | Performed by: INTERNAL MEDICINE

## 2019-05-21 PROCEDURE — 93299 CHECK IMPLANT CARDIO OR SUBQ RHYTHM MNTR REMOTE UP TO 30 DAY TECH EVAL: CPT | Performed by: INTERNAL MEDICINE

## 2019-05-28 ENCOUNTER — ANCILLARY PROCEDURE (OUTPATIENT)
Dept: CARDIOLOGY | Age: 44
End: 2019-05-28
Attending: INTERNAL MEDICINE

## 2019-05-28 ENCOUNTER — OFFICE VISIT (OUTPATIENT)
Dept: PULMONOLOGY | Facility: CLINIC | Age: 44
End: 2019-05-28
Payer: COMMERCIAL

## 2019-05-28 VITALS
HEIGHT: 73 IN | HEART RATE: 56 BPM | WEIGHT: 214 LBS | OXYGEN SATURATION: 98 % | BODY MASS INDEX: 28.36 KG/M2 | DIASTOLIC BLOOD PRESSURE: 66 MMHG | SYSTOLIC BLOOD PRESSURE: 119 MMHG

## 2019-05-28 DIAGNOSIS — Z45.09 ENCOUNTER FOR LOOP RECORDER CHECK: ICD-10-CM

## 2019-05-28 DIAGNOSIS — R06.00 DYSPNEA ON EXERTION: Primary | ICD-10-CM

## 2019-05-28 PROCEDURE — 99212 OFFICE O/P EST SF 10 MIN: CPT | Performed by: INTERNAL MEDICINE

## 2019-05-28 PROCEDURE — 99213 OFFICE O/P EST LOW 20 MIN: CPT | Performed by: INTERNAL MEDICINE

## 2019-05-28 NOTE — TELEPHONE ENCOUNTER
Requested medication: Otezla 30mg     LOV: 4/12/2019  Future Appointments   Date Time Provider Ivania Carrizales   7/24/2019  2:00 PM Adelita Liriano MD [address] [place] [address]   7/26/2019 11:30 AM Leti Fernando MD [address] [place] [address]   5/26/2020  3:45 PM Arya

## 2019-05-28 NOTE — PROGRESS NOTES
The patient is a 40-year-old male who I know well from prior evaluation and comes in now for follow-up. He just completed a 52 mile ultra marathon without problem except for back discomfort. His breathing is good.   He feels as though both the [facility] and

## 2019-06-17 ENCOUNTER — OFFICE VISIT (OUTPATIENT)
Dept: FAMILY MEDICINE CLINIC | Facility: CLINIC | Age: 44
End: 2019-06-17
Payer: COMMERCIAL

## 2019-06-17 VITALS
DIASTOLIC BLOOD PRESSURE: 82 MMHG | SYSTOLIC BLOOD PRESSURE: 153 MMHG | HEART RATE: 59 BPM | BODY MASS INDEX: 27.86 KG/M2 | TEMPERATURE: 98 F | HEIGHT: 73 IN | WEIGHT: 210.19 LBS

## 2019-06-17 DIAGNOSIS — G47.00 INSOMNIA, UNSPECIFIED TYPE: Primary | ICD-10-CM

## 2019-06-17 DIAGNOSIS — F41.9 ANXIETY: ICD-10-CM

## 2019-06-17 PROCEDURE — 99212 OFFICE O/P EST SF 10 MIN: CPT | Performed by: FAMILY MEDICINE

## 2019-06-17 PROCEDURE — 99213 OFFICE O/P EST LOW 20 MIN: CPT | Performed by: FAMILY MEDICINE

## 2019-06-17 RX ORDER — ZOLPIDEM TARTRATE 5 MG/1
5 TABLET ORAL NIGHTLY PRN
Qty: 30 TABLET | Refills: 0 | Status: SHIPPED | OUTPATIENT
Start: 2019-06-17 | End: 2021-06-11 | Stop reason: ALTCHOICE

## 2019-06-17 NOTE — PROGRESS NOTES
HPI:    Korina Umanzor is a 40year old male presents to clinic with concerns regarding insomnia and anxiety. About 10 days back, patient  from his wife. Since then, he has not slept or ate properly. Is starting therapy today.   Has tried 2000 units Oral Cap Take by mouth daily. Disp:  Rfl:    triamcinolone acetonide 0.1 % External Cream Apply topically 2 (two) times daily as needed. Disp: 60 g Rfl: 0   aspirin 81 MG Oral Tab Take 81 mg by mouth daily.  Disp:  Rfl:    Multiple Vitamin (MULTI MG Oral Tab 30 tablet 0     Sig: Take 1 tablet (5 mg total) by mouth nightly as needed for Sleep.        Imaging & Referrals:  None       6/17/2019  West Vogel MD

## 2019-06-21 PROCEDURE — 93299 CHECK IMPLANT CARDIO OR SUBQ RHYTHM MNTR REMOTE UP TO 30 DAY TECH EVAL: CPT | Performed by: INTERNAL MEDICINE

## 2019-06-21 PROCEDURE — 93298 REM INTERROG DEV EVAL SCRMS: CPT | Performed by: INTERNAL MEDICINE

## 2019-06-24 ENCOUNTER — TELEPHONE (OUTPATIENT)
Dept: FAMILY MEDICINE CLINIC | Facility: CLINIC | Age: 44
End: 2019-06-24

## 2019-06-24 NOTE — TELEPHONE ENCOUNTER
Pt reports he was seen by SK last week and following up with a counselor/therapist for insomnia/anxiety. Pt recently .  Pt has not taken the Ambien as prescribed and states he is thinking that he can manage without it, thinking he is just tired fro

## 2019-06-26 ENCOUNTER — OFFICE VISIT (OUTPATIENT)
Dept: FAMILY MEDICINE CLINIC | Facility: CLINIC | Age: 44
End: 2019-06-26
Payer: COMMERCIAL

## 2019-06-26 VITALS
TEMPERATURE: 98 F | DIASTOLIC BLOOD PRESSURE: 70 MMHG | HEIGHT: 73 IN | SYSTOLIC BLOOD PRESSURE: 117 MMHG | BODY MASS INDEX: 27.83 KG/M2 | WEIGHT: 210 LBS | HEART RATE: 71 BPM

## 2019-06-26 DIAGNOSIS — F32.A DEPRESSION, UNSPECIFIED DEPRESSION TYPE: Primary | ICD-10-CM

## 2019-06-26 PROCEDURE — 99212 OFFICE O/P EST SF 10 MIN: CPT | Performed by: FAMILY MEDICINE

## 2019-06-26 PROCEDURE — 99214 OFFICE O/P EST MOD 30 MIN: CPT | Performed by: FAMILY MEDICINE

## 2019-06-26 RX ORDER — SERTRALINE HYDROCHLORIDE 25 MG/1
25 TABLET, FILM COATED ORAL DAILY
Qty: 90 TABLET | Refills: 0 | Status: SHIPPED | OUTPATIENT
Start: 2019-06-26 | End: 2019-07-22 | Stop reason: SINTOL

## 2019-07-19 ENCOUNTER — ANCILLARY PROCEDURE (OUTPATIENT)
Dept: CARDIOLOGY | Age: 44
End: 2019-07-19
Attending: INTERNAL MEDICINE

## 2019-07-19 ENCOUNTER — ANCILLARY ORDERS (OUTPATIENT)
Dept: CARDIOLOGY | Age: 44
End: 2019-07-19

## 2019-07-19 DIAGNOSIS — Z98.890 HISTORY OF LOOP RECORDER: ICD-10-CM

## 2019-07-21 PROCEDURE — 93298 REM INTERROG DEV EVAL SCRMS: CPT | Performed by: INTERNAL MEDICINE

## 2019-07-21 PROCEDURE — 93299 CHECK IMPLANT CARDIO OR SUBQ RHYTHM MNTR REMOTE UP TO 30 DAY TECH EVAL: CPT | Performed by: INTERNAL MEDICINE

## 2019-07-22 ENCOUNTER — APPOINTMENT (OUTPATIENT)
Dept: LAB | Age: 44
End: 2019-07-22
Attending: FAMILY MEDICINE
Payer: COMMERCIAL

## 2019-07-22 ENCOUNTER — OFFICE VISIT (OUTPATIENT)
Dept: FAMILY MEDICINE CLINIC | Facility: CLINIC | Age: 44
End: 2019-07-22
Payer: COMMERCIAL

## 2019-07-22 VITALS
WEIGHT: 213.38 LBS | HEART RATE: 50 BPM | DIASTOLIC BLOOD PRESSURE: 75 MMHG | HEIGHT: 72.5 IN | SYSTOLIC BLOOD PRESSURE: 133 MMHG | BODY MASS INDEX: 28.59 KG/M2 | TEMPERATURE: 97 F

## 2019-07-22 DIAGNOSIS — Z00.00 ANNUAL PHYSICAL EXAM: Primary | ICD-10-CM

## 2019-07-22 DIAGNOSIS — F32.A DEPRESSION, UNSPECIFIED DEPRESSION TYPE: ICD-10-CM

## 2019-07-22 LAB
ABSOLUTE IMMATURE GRANULOCYTES (OFFPRE24): NORMAL
ALBUMIN SERPL-MCNC: 3.6 G/DL
ALBUMIN/GLOB SERPL: NORMAL {RATIO}
ALP SERPL-CCNC: 57 U/L
ALT SERPL-CCNC: 34 U/L
ANION GAP SERPL CALC-SCNC: 2 MMOL/L
AST SERPL-CCNC: 17 U/L
BASO+EOS+MONOS # BLD: NORMAL 10*3/UL
BASO+EOS+MONOS NFR BLD: NORMAL %
BASOPHILS # BLD: NORMAL 10*3/UL
BASOPHILS NFR BLD: NORMAL %
BILIRUB SERPL-MCNC: 0.5 MG/DL
BUN SERPL-MCNC: 18 MG/DL
BUN/CREAT SERPL: NORMAL
CALCIUM SERPL-MCNC: 8.7 MG/DL
CHLORIDE SERPL-SCNC: 110 MMOL/L
CHOLEST SERPL-MCNC: 159 MG/DL
CHOLEST/HDLC SERPL: NORMAL {RATIO}
CO2 SERPL-SCNC: 30 MMOL/L
CREAT SERPL-MCNC: 1.02 MG/DL
DIFFERENTIAL METHOD BLD: NORMAL
EOSINOPHIL # BLD: NORMAL 10*3/UL
EOSINOPHIL NFR BLD: NORMAL %
ERYTHROCYTE [DISTWIDTH] IN BLOOD: NORMAL %
GLOBULIN SER-MCNC: 2.7 G/DL
GLUCOSE SERPL-MCNC: 87 MG/DL
HCT VFR BLD CALC: 42.2 %
HDLC SERPL-MCNC: 47 MG/DL
HGB BLD-MCNC: 13.8 G/DL
IMMATURE GRANULOCYTES (OFFPRE25): NORMAL
LDLC SERPL CALC-MCNC: 103 MG/DL
LENGTH OF FAST TIME PATIENT: YES H
LENGTH OF FAST TIME PATIENT: YES H
LYMPHOCYTES # BLD: NORMAL 10*3/UL
LYMPHOCYTES NFR BLD: NORMAL %
MCH RBC QN AUTO: 30 PG
MCHC RBC AUTO-ENTMCNC: 32.7 G/DL
MCV RBC AUTO: 91.7 FL
MONOCYTES # BLD: NORMAL 10*3/UL
MONOCYTES NFR BLD: NORMAL %
MPV (OFFPRE2): NORMAL
NEUTROPHILS # BLD: NORMAL 10*3/UL
NEUTROPHILS NFR BLD: NORMAL %
NONHDLC SERPL-MCNC: 112 MG/DL
NRBC BLD MANUAL-RTO: NORMAL %
PLAT MORPH BLD: NORMAL
PLATELET # BLD: 214 10*3/UL
POTASSIUM SERPL-SCNC: 4.9 MMOL/L
PROT SERPL-MCNC: 6.3 G/DL
RBC # BLD: 4.6 10*6/UL
RBC MORPH BLD: NORMAL
SODIUM SERPL-SCNC: 142 MMOL/L
TRIGL SERPL-MCNC: 43 MG/DL
VLDLC SERPL CALC-MCNC: 9 MG/DL
WBC # BLD: 5.7 10*3/UL
WBC MORPH BLD: NORMAL

## 2019-07-22 PROCEDURE — 99396 PREV VISIT EST AGE 40-64: CPT | Performed by: FAMILY MEDICINE

## 2019-07-22 RX ORDER — BUPROPION HYDROCHLORIDE 150 MG/1
150 TABLET, EXTENDED RELEASE ORAL 2 TIMES DAILY
Qty: 180 TABLET | Refills: 0 | Status: SHIPPED | OUTPATIENT
Start: 2019-07-22 | End: 2019-12-19

## 2019-07-22 NOTE — PROGRESS NOTES
HPI:    Denis Dunbar is a 40year old male presents to clinic for an annual physical.   Depression - mood has improved. Feels Sertraline has caused him to lose his erection during sex. Would like to discuss alternative options. No other concerns. Calcipotriene-Betameth Diprop 0.005-0.064 % External Ointment as needed. Disp:  Rfl: 2   Cholecalciferol (VITAMIN D) 2000 units Oral Cap Take by mouth daily.  Disp:  Rfl:    triamcinolone acetonide 0.1 % External Cream Apply topically 2 (two) times daily T4,         LIPID PANEL, PSA SCREEN  - Immunizations UTD   - Reinforced healthy diet, lifestyle, and exercise.   - Dentist visits Q6 months advised  - Annual eye exams advised      (F32.9) Depression, unspecified depression type  Plan:   -Sertraline stopped

## 2019-08-21 PROCEDURE — 93299 CHECK IMPLANT CARDIO OR SUBQ RHYTHM MNTR REMOTE UP TO 30 DAY TECH EVAL: CPT | Performed by: INTERNAL MEDICINE

## 2019-08-21 PROCEDURE — 93298 REM INTERROG DEV EVAL SCRMS: CPT | Performed by: INTERNAL MEDICINE

## 2019-08-30 ENCOUNTER — OFFICE VISIT (OUTPATIENT)
Dept: RHEUMATOLOGY | Facility: CLINIC | Age: 44
End: 2019-08-30
Payer: COMMERCIAL

## 2019-08-30 VITALS
RESPIRATION RATE: 16 BRPM | SYSTOLIC BLOOD PRESSURE: 112 MMHG | WEIGHT: 206 LBS | HEART RATE: 70 BPM | BODY MASS INDEX: 27.6 KG/M2 | DIASTOLIC BLOOD PRESSURE: 67 MMHG | HEIGHT: 72.5 IN

## 2019-08-30 DIAGNOSIS — L40.9 PSORIASIS: ICD-10-CM

## 2019-08-30 DIAGNOSIS — L40.50 PSORIATIC ARTHRITIS (HCC): Primary | ICD-10-CM

## 2019-08-30 PROCEDURE — 99214 OFFICE O/P EST MOD 30 MIN: CPT | Performed by: INTERNAL MEDICINE

## 2019-08-30 RX ORDER — MELOXICAM 15 MG/1
15 TABLET ORAL DAILY
Qty: 30 TABLET | Refills: 2 | Status: SHIPPED | OUTPATIENT
Start: 2019-08-30 | End: 2019-11-04

## 2019-08-30 NOTE — PROGRESS NOTES
Mayo Columbus is a 40year old male. HPI:   Patient presents with:  Psoriatic Arthritis: follow up    I had the pleasure of seeing Mayo  on 2019 for follow up Psoriasis and PsA.      Current Medications:  Otezla 30 mg twice day- st 0.005-0.064 % External Ointment as needed. Disp:  Rfl: 2   Cholecalciferol (VITAMIN D) 2000 units Oral Cap Take by mouth daily. Disp:  Rfl:    aspirin 81 MG Oral Tab Take 81 mg by mouth daily.  Disp:  Rfl:    Multiple Vitamin (MULTI VITAMIN MENS) Oral Tab T uL 5.7   RBC      4.30 - 5.70 x10(6)uL 4.60   Hemoglobin      13.0 - 17.5 g/dL 13.8   Hematocrit      39.0 - 53.0 % 42.2   MCV      80.0 - 100.0 fL 91.7   MCH      26.0 - 34.0 pg 30.0   MCHC      31.0 - 37.0 g/dL 32.7   RDW-SD      35.1 - 46.3 fL 41.6   RD degenerative changes first metatarsal head. 2. Tiny plantar spur. 3. Suggestive of pes planus on lateral view.  Otherwise, essentially negative radiographs of the right foot    Echocardiogram  Tricuspid regurgitation and right ventricle systolic pressure

## 2019-09-09 ENCOUNTER — PATIENT MESSAGE (OUTPATIENT)
Dept: RHEUMATOLOGY | Facility: CLINIC | Age: 44
End: 2019-09-09

## 2019-09-09 NOTE — TELEPHONE ENCOUNTER
From: Vincent Rivera [place]  To: Tanner Lynch MD  Sent: 9/9/2019 10:24 AM CDT  Subject: Prescription Question    Hannah Reyna you had a great weekend and start to the day.  Just as an FYI, over the last week my foot has felt great and I did an 8 mi

## 2019-09-21 PROCEDURE — 93299 CHECK IMPLANT CARDIO OR SUBQ RHYTHM MNTR REMOTE UP TO 30 DAY TECH EVAL: CPT | Performed by: INTERNAL MEDICINE

## 2019-09-21 PROCEDURE — 93298 REM INTERROG DEV EVAL SCRMS: CPT | Performed by: INTERNAL MEDICINE

## 2019-09-30 RX ORDER — APREMILAST 30 MG/1
TABLET, FILM COATED ORAL
Qty: 60 TABLET | Refills: 2 | Status: SHIPPED | OUTPATIENT
Start: 2019-09-30 | End: 2019-12-20

## 2019-09-30 NOTE — TELEPHONE ENCOUNTER
Requested Prescriptions     Pending Prescriptions Disp Refills   • OTEZLA 30 MG Oral Tab [Pharmacy Med Name: OTEZLA 30 MG TABLET] 60 tablet 0     Sig: TAKE 1 TABLET BY MOUTH TWICE DAILY     Last filled: 5/28/2019 #60 tab w/ 2 refills  LOV: 8/30/2019  Markus 45 - 117 U/L 57   Total Bilirubin      0.1 - 2.0 mg/dL 0.5   TOTAL PROTEIN      6.4 - 8.2 g/dL 6.3 (L)   Albumin      3.4 - 5.0 g/dL 3.6   Globulin      2.8 - 4.4 g/dL 2.7 (L)   A/G Ratio      1.0 - 2.0 1.3   Patient Fasting?        Yes     ASSESSMENT/PLAN:

## 2019-10-11 ENCOUNTER — TELEPHONE (OUTPATIENT)
Dept: CARDIOLOGY | Age: 44
End: 2019-10-11

## 2019-10-11 ENCOUNTER — ANCILLARY PROCEDURE (OUTPATIENT)
Dept: CARDIOLOGY | Age: 44
End: 2019-10-11
Attending: INTERNAL MEDICINE

## 2019-10-11 DIAGNOSIS — Z45.09 ENCOUNTER FOR LOOP RECORDER CHECK: ICD-10-CM

## 2019-10-22 PROCEDURE — 93298 REM INTERROG DEV EVAL SCRMS: CPT | Performed by: INTERNAL MEDICINE

## 2019-10-22 PROCEDURE — 93299 CHECK IMPLANT CARDIO OR SUBQ RHYTHM MNTR REMOTE UP TO 30 DAY TECH EVAL: CPT | Performed by: INTERNAL MEDICINE

## 2019-10-24 ENCOUNTER — OFFICE VISIT (OUTPATIENT)
Dept: FAMILY MEDICINE CLINIC | Facility: CLINIC | Age: 44
End: 2019-10-24
Payer: COMMERCIAL

## 2019-10-24 VITALS
HEART RATE: 72 BPM | TEMPERATURE: 98 F | WEIGHT: 220.19 LBS | SYSTOLIC BLOOD PRESSURE: 140 MMHG | DIASTOLIC BLOOD PRESSURE: 72 MMHG | HEIGHT: 72.5 IN | RESPIRATION RATE: 18 BRPM | BODY MASS INDEX: 29.5 KG/M2

## 2019-10-24 DIAGNOSIS — F32.A DEPRESSION, UNSPECIFIED DEPRESSION TYPE: Primary | ICD-10-CM

## 2019-10-24 PROCEDURE — 99213 OFFICE O/P EST LOW 20 MIN: CPT | Performed by: FAMILY MEDICINE

## 2019-10-24 NOTE — PROGRESS NOTES
HPI:    Brit Mckinnon is a 40year old male presents to clinic to follow-up regarding depression. Would like to discuss Zoloft.   Reports that since increasing his dose he is felt more fatigued, has decreased exercise capacity and has gained almost Activated, Inhale 1 puff into the lungs daily. Follow with mouth rinse and spit, Disp: 1 each, Rfl: 6  Calcipotriene-Betameth Diprop 0.005-0.064 % External Ointment, as needed. , Disp: , Rfl: 2  Cholecalciferol (VITAMIN D) 2000 units Oral Cap, Take by mouth understanding of information discussed. No barriers to learning observed. Orders This Visit:  No orders of the defined types were placed in this encounter.       Meds This Visit:  Requested Prescriptions      No prescriptions requested or ordered

## 2019-11-04 RX ORDER — MELOXICAM 15 MG/1
15 TABLET ORAL DAILY
Qty: 30 TABLET | Refills: 2 | Status: SHIPPED | OUTPATIENT
Start: 2019-11-04 | End: 2020-01-31

## 2019-11-04 NOTE — TELEPHONE ENCOUNTER
Requested Prescriptions     Pending Prescriptions Disp Refills   • Meloxicam 15 MG Oral Tab 30 tablet 2     Sig: Take 1 tablet (15 mg total) by mouth daily.      Last filled:8/30/2019 #30 tab w/ 2 refills  LOV: 8/30/2019  Future Appointments   Date Time Pro Bilirubin      0.1 - 2.0 mg/dL 0.5   TOTAL PROTEIN      6.4 - 8.2 g/dL 6.3 (L)   Albumin      3.4 - 5.0 g/dL 3.6   Globulin      2.8 - 4.4 g/dL 2.7 (L)   A/G Ratio      1.0 - 2.0 1.3   Patient Fasting?        Yes     ASSESSMENT/PLAN:      This is a 44-year-

## 2019-11-06 ENCOUNTER — E-ADVICE (OUTPATIENT)
Dept: CARDIOLOGY | Age: 44
End: 2019-11-06

## 2019-11-06 ENCOUNTER — TELEPHONE (OUTPATIENT)
Dept: CARDIOLOGY | Age: 44
End: 2019-11-06

## 2019-11-08 ENCOUNTER — TELEPHONE (OUTPATIENT)
Dept: CARDIOLOGY | Age: 44
End: 2019-11-08

## 2019-11-18 ENCOUNTER — HOSPITAL ENCOUNTER (OUTPATIENT)
Dept: INTERVENTIONAL RADIOLOGY/VASCULAR | Facility: HOSPITAL | Age: 44
Discharge: HOME | End: 2019-11-18
Attending: INTERNAL MEDICINE | Admitting: INTERNAL MEDICINE
Payer: COMMERCIAL

## 2019-11-18 VITALS
DIASTOLIC BLOOD PRESSURE: 77 MMHG | WEIGHT: 223 LBS | HEART RATE: 62 BPM | SYSTOLIC BLOOD PRESSURE: 135 MMHG | BODY MASS INDEX: 30 KG/M2 | OXYGEN SATURATION: 96 % | RESPIRATION RATE: 12 BRPM

## 2019-11-18 DIAGNOSIS — Z45.09 ENCOUNTER FOR LOOP RECORDER AT END OF BATTERY LIFE: ICD-10-CM

## 2019-11-18 PROCEDURE — 33286 RMVL SUBQ CAR RHYTHM MNTR: CPT | Performed by: INTERNAL MEDICINE

## 2019-11-18 PROCEDURE — 33286 RMVL SUBQ CAR RHYTHM MNTR: CPT

## 2019-11-18 PROCEDURE — 0JPT32Z REMOVAL OF MONITORING DEVICE FROM TRUNK SUBCUTANEOUS TISSUE AND FASCIA, PERCUTANEOUS APPROACH: ICD-10-PCS | Performed by: INTERNAL MEDICINE

## 2019-11-18 RX ORDER — LIDOCAINE HYDROCHLORIDE AND EPINEPHRINE 20; 5 MG/ML; UG/ML
INJECTION, SOLUTION EPIDURAL; INFILTRATION; INTRACAUDAL; PERINEURAL
Status: DISCONTINUED
Start: 2019-11-18 | End: 2019-11-18

## 2019-11-18 RX ORDER — LIDOCAINE HYDROCHLORIDE AND EPINEPHRINE 10; 10 MG/ML; UG/ML
INJECTION, SOLUTION INFILTRATION; PERINEURAL
Status: COMPLETED
Start: 2019-11-18 | End: 2019-11-18

## 2019-11-18 NOTE — PROGRESS NOTES
Dr. Dario Fitzpatrick in room . Time out performed. Patient prepped and draped in sterile fashion   Chlorhexidine prep and dry for 3 minutes before draping. Loop recorder removed. No complaints of pain or active bleeding.   Site sutured and draped in sterile fas

## 2019-11-18 NOTE — H&P
History & Physical Examination    Patient Name: Salvatore Ellis    Diagnosis: loop cedrick    Present Illness: HPI:     Patient is a 55-year-old with a history of syncope and palpitations were concerned for cardiac cause so a loop monitor was implanted. .    regurgitation.  Indirectly   calculated tricuspid regurgitation volume 41 mL, tricuspid regurgitation   fraction 27%. 7. Artifact related to subcutaneous loop recorder left chest wall. He wants to continue to do ultra marathons and wants clearance.  The discharge. Loop monitor placed 12/17 1/18 Loop recorder showed 3 episodes of AF on 1/16/18, longest episode lasted 19 minutes. ordered pt to take ASA 81mg daily.  chadsvasc = 0  1/19 several episodes labeled his A. fib but analyzed in all the strips i consultations at the hypertrophic [facility] clinic.       Current Outpatient Medications   Medication Sig Dispense Refill   • Multiple Vitamin (MULTI VITAMIN MENS) Tab Take 1 tablet by mouth. • fludrocortisone (FLORINEF) 0.1 MG tablet Take 0.1 mg by mouth.    • focal deficits  PSYCH:alert and oriented x3    The patient indicates understanding of these issues and agrees to the plan.       Allergies: No Known Allergies    Past Medical History:   Diagnosis Date   • Bradycardia    • Deviated nasal septum    • Hypertro

## 2019-11-18 NOTE — IVS NOTE
Patient is able to sit up and ambulate without difficulty. Procedure access site is dry and intact with good motion, circulation and sensation. No signs and symptoms of bleeding or hematoma. Vital signs remain stable at this time.   Dr. Chantel Eldridge spoke with

## 2019-11-19 NOTE — PROCEDURES
Procedure- Loop device removal (Loop Monitor) SUMEET Rodriguez MD    Indication- Arrhythmia evaluation, device end of life  Complication- none  EBL - minimal  Sedation - local lidocaine    Methods- The patient was prepped and draped in a sterile

## 2019-11-20 DIAGNOSIS — Z45.09 ENCOUNTER FOR LOOP RECORDER AT END OF BATTERY LIFE: ICD-10-CM

## 2019-11-25 RX ORDER — TRIAMCINOLONE ACETONIDE 1 MG/G
1 CREAM TOPICAL 2 TIMES DAILY
COMMUNITY
Start: 2018-05-03 | End: 2019-11-26

## 2019-11-25 RX ORDER — ZOLPIDEM TARTRATE 5 MG/1
5 TABLET ORAL PRN
COMMUNITY
Start: 2019-06-17

## 2019-11-25 RX ORDER — APREMILAST 30 MG/1
30 TABLET, FILM COATED ORAL DAILY
Refills: 1 | COMMUNITY
Start: 2019-11-04

## 2019-11-25 RX ORDER — BUPROPION HYDROCHLORIDE 150 MG/1
150 TABLET, EXTENDED RELEASE ORAL 2 TIMES DAILY
COMMUNITY
Start: 2019-07-22 | End: 2019-11-26

## 2019-11-25 RX ORDER — MELOXICAM 15 MG/1
15 TABLET ORAL DAILY
Refills: 2 | COMMUNITY
Start: 2019-11-04

## 2019-11-26 ENCOUNTER — OFFICE VISIT (OUTPATIENT)
Dept: CARDIOLOGY | Age: 44
End: 2019-11-26

## 2019-11-26 VITALS
WEIGHT: 220 LBS | SYSTOLIC BLOOD PRESSURE: 124 MMHG | DIASTOLIC BLOOD PRESSURE: 56 MMHG | HEIGHT: 73 IN | HEART RATE: 59 BPM | BODY MASS INDEX: 29.16 KG/M2 | OXYGEN SATURATION: 97 %

## 2019-11-26 DIAGNOSIS — I42.1 HOCM (HYPERTROPHIC OBSTRUCTIVE CARDIOMYOPATHY) (CMD): ICD-10-CM

## 2019-11-26 DIAGNOSIS — Z51.89 VISIT FOR WOUND CHECK: Primary | ICD-10-CM

## 2019-11-26 PROCEDURE — 99213 OFFICE O/P EST LOW 20 MIN: CPT | Performed by: NURSE PRACTITIONER

## 2019-11-26 SDOH — HEALTH STABILITY: MENTAL HEALTH: HOW OFTEN DO YOU HAVE A DRINK CONTAINING ALCOHOL?: NEVER

## 2019-11-26 ASSESSMENT — PATIENT HEALTH QUESTIONNAIRE - PHQ9
1. LITTLE INTEREST OR PLEASURE IN DOING THINGS: NOT AT ALL
2. FEELING DOWN, DEPRESSED OR HOPELESS: NOT AT ALL
SUM OF ALL RESPONSES TO PHQ9 QUESTIONS 1 AND 2: 0
SUM OF ALL RESPONSES TO PHQ9 QUESTIONS 1 AND 2: 0

## 2019-12-20 NOTE — TELEPHONE ENCOUNTER
Requested Prescriptions     Pending Prescriptions Disp Refills   • Apremilast (OTEZLA) 30 MG Oral Tab 60 tablet 2     Sig: Take 1 tablet by mouth 2 (two) times daily.      Last filled: 9/30/19 #60 tab w/ 2 rf  LOV:  8/30/19  Future Appointments   Date Time 17   ALKALINE PHOSPHATASE      45 - 117 U/L 57   Total Bilirubin      0.1 - 2.0 mg/dL 0.5   TOTAL PROTEIN      6.4 - 8.2 g/dL 6.3 (L)   Albumin      3.4 - 5.0 g/dL 3.6   Globulin      2.8 - 4.4 g/dL 2.7 (L)   A/G Ratio      1.0 - 2.0 1.3   Patient Fasting?

## 2019-12-26 ENCOUNTER — APPOINTMENT (OUTPATIENT)
Dept: LAB | Age: 44
End: 2019-12-26
Attending: FAMILY MEDICINE
Payer: COMMERCIAL

## 2019-12-26 ENCOUNTER — OFFICE VISIT (OUTPATIENT)
Dept: FAMILY MEDICINE CLINIC | Facility: CLINIC | Age: 44
End: 2019-12-26
Payer: COMMERCIAL

## 2019-12-26 ENCOUNTER — HOSPITAL ENCOUNTER (OUTPATIENT)
Dept: GENERAL RADIOLOGY | Age: 44
Discharge: HOME | End: 2019-12-26
Attending: FAMILY MEDICINE
Payer: COMMERCIAL

## 2019-12-26 VITALS
HEART RATE: 51 BPM | TEMPERATURE: 98 F | RESPIRATION RATE: 16 BRPM | WEIGHT: 229 LBS | BODY MASS INDEX: 31 KG/M2 | DIASTOLIC BLOOD PRESSURE: 70 MMHG | SYSTOLIC BLOOD PRESSURE: 131 MMHG

## 2019-12-26 DIAGNOSIS — M25.512 ACUTE PAIN OF LEFT SHOULDER: ICD-10-CM

## 2019-12-26 DIAGNOSIS — Z71.84 TRAVEL ADVICE ENCOUNTER: ICD-10-CM

## 2019-12-26 DIAGNOSIS — Z71.84 TRAVEL ADVICE ENCOUNTER: Primary | ICD-10-CM

## 2019-12-26 PROCEDURE — 86762 RUBELLA ANTIBODY: CPT

## 2019-12-26 PROCEDURE — 90715 TDAP VACCINE 7 YRS/> IM: CPT | Performed by: FAMILY MEDICINE

## 2019-12-26 PROCEDURE — 73030 X-RAY EXAM OF SHOULDER: CPT | Performed by: FAMILY MEDICINE

## 2019-12-26 PROCEDURE — 99214 OFFICE O/P EST MOD 30 MIN: CPT | Performed by: FAMILY MEDICINE

## 2019-12-26 PROCEDURE — 36415 COLL VENOUS BLD VENIPUNCTURE: CPT

## 2019-12-26 PROCEDURE — 90632 HEPA VACCINE ADULT IM: CPT | Performed by: FAMILY MEDICINE

## 2019-12-26 PROCEDURE — 86735 MUMPS ANTIBODY: CPT

## 2019-12-26 PROCEDURE — 86765 RUBEOLA ANTIBODY: CPT

## 2019-12-26 PROCEDURE — 90471 IMMUNIZATION ADMIN: CPT | Performed by: FAMILY MEDICINE

## 2019-12-26 PROCEDURE — 90472 IMMUNIZATION ADMIN EACH ADD: CPT | Performed by: FAMILY MEDICINE

## 2019-12-26 PROCEDURE — 90686 IIV4 VACC NO PRSV 0.5 ML IM: CPT | Performed by: FAMILY MEDICINE

## 2019-12-26 PROCEDURE — 86706 HEP B SURFACE ANTIBODY: CPT

## 2019-12-26 RX ORDER — AZITHROMYCIN 250 MG/1
TABLET, FILM COATED ORAL
Qty: 6 TABLET | Refills: 0 | Status: SHIPPED | OUTPATIENT
Start: 2019-12-26 | End: 2020-07-29 | Stop reason: ALTCHOICE

## 2019-12-26 RX ORDER — ALBUTEROL SULFATE 90 UG/1
AEROSOL, METERED RESPIRATORY (INHALATION)
Qty: 1 INHALER | Refills: 5 | Status: SHIPPED | OUTPATIENT
Start: 2019-12-26

## 2020-01-08 ENCOUNTER — PATIENT MESSAGE (OUTPATIENT)
Dept: RHEUMATOLOGY | Facility: CLINIC | Age: 45
End: 2020-01-08

## 2020-01-09 NOTE — TELEPHONE ENCOUNTER
*Preferred specialty pharmacy ([facility]) requesting new script for the new year. *      Last filled: 12/20/19 #60 tab with 2 refills   LOV: 8/30/19  Future Appointments   Date Time Provider Ivania Carrizales   5/26/2020  3:45 PM Tracey Mathews MD [facility]

## 2020-01-31 RX ORDER — MELOXICAM 15 MG/1
15 TABLET ORAL DAILY
Qty: 30 TABLET | Refills: 2 | Status: SHIPPED | OUTPATIENT
Start: 2020-01-31 | End: 2020-05-18

## 2020-01-31 NOTE — TELEPHONE ENCOUNTER
Last filled: 11/4/19 #30 tab with 2 refills    LOV: 8/30/19   Future Appointments   Date Time Provider Ivania Carrizales   5/26/2020  3:45 PM Conchita Dixon MD [facility]     Labs:  7/22/19       ASSESSMENT/PLAN:      This is a 42-year-old ma

## 2020-02-06 ENCOUNTER — TELEPHONE (OUTPATIENT)
Dept: CARDIOLOGY | Age: 45
End: 2020-02-06

## 2020-02-20 NOTE — PROGRESS NOTES
HPI:    Ric Cooper is a 40year old male presents to clinic with wife with concerns regarding depression. Him and his wife are recently  because of issues with infidelity.   Patient's wife is concerned, says he is extremely depressed bec Apixaban/Eliquis/Influenza Vaccination Rfl: 2   Albuterol Sulfate  (90 Base) MCG/ACT Inhalation Aero Soln inhale 2 puff by inhalation route  every 4 - 6 hours as needed Disp: 1 Inhaler Rfl: 5   Fluticasone Furoate-Vilanterol (BREO ELLIPTA) 100-25 MCG/INH Inhalation Aerosol Powder, Breath patient with more than 12.5 minutes of counseling. Orders This Visit:  No orders of the defined types were placed in this encounter.       Meds This Visit:  Requested Prescriptions     Signed Prescriptions Disp Refills   • Sertraline HCl 25 MG Oral T

## 2020-03-18 ENCOUNTER — PATIENT MESSAGE (OUTPATIENT)
Dept: RHEUMATOLOGY | Facility: CLINIC | Age: 45
End: 2020-03-18

## 2020-03-18 NOTE — TELEPHONE ENCOUNTER
Please see below. Okay to inform pt ibuprofen/NSAIDs should be avoided in pts who are actively diagnosed with COVID 19? If no respiratory symptoms, continue meloxicam as prescribed? Please advise.

## 2020-03-18 NOTE — TELEPHONE ENCOUNTER
Agree if he starts to have any respiratory symptoms he should stop NSAIDs like meloxicam and only take Tylenol

## 2020-03-18 NOTE — TELEPHONE ENCOUNTER
From: Asia [place]  To: Keith Marino MD  Sent: 3/18/2020 12:54 PM CDT  Subject: Prescription Question    Good Afternoon Dr. Harini Bradley you are having a great week.  It was just recently reported that we should avoid Ibuprofen given the coronavirus

## 2020-04-08 NOTE — TELEPHONE ENCOUNTER
Last filled: 1/9/2020 #60 tab with 2 refills   LOV: 8/30/19  Future Appointments   Date Time Provider Ivania Carrizales   5/26/2020  3:45 PM Laure Rhodes MD [facility]     Labs: 7/22/19    ASSESSMENT/PLAN:      This is a 42-year-old male wi

## 2020-04-10 ENCOUNTER — APPOINTMENT (OUTPATIENT)
Dept: CARDIOLOGY | Age: 45
End: 2020-04-10

## 2020-05-18 RX ORDER — MELOXICAM 15 MG/1
15 TABLET ORAL DAILY
Qty: 30 TABLET | Refills: 2 | Status: SHIPPED | OUTPATIENT
Start: 2020-05-18 | End: 2020-08-24

## 2020-05-18 NOTE — TELEPHONE ENCOUNTER
LOV: 8/30/2019  Future Appointments   Date Time Provider Ivania Carrizales   5/26/2020  3:45 PM Moiz Espinoza MD [facility]     Labs:   Component      Latest Ref Rng & Units 7/22/2019   Glucose      70 - 99 mg/dL 87   Sodium      136 - 145 m

## 2020-05-26 ENCOUNTER — OFFICE VISIT (OUTPATIENT)
Dept: PULMONOLOGY | Facility: CLINIC | Age: 45
End: 2020-05-26
Payer: COMMERCIAL

## 2020-05-26 VITALS
SYSTOLIC BLOOD PRESSURE: 153 MMHG | OXYGEN SATURATION: 97 % | HEIGHT: 73 IN | HEART RATE: 64 BPM | WEIGHT: 218 LBS | BODY MASS INDEX: 28.89 KG/M2 | DIASTOLIC BLOOD PRESSURE: 80 MMHG | RESPIRATION RATE: 18 BRPM

## 2020-05-26 DIAGNOSIS — R06.00 DYSPNEA ON EXERTION: Primary | ICD-10-CM

## 2020-05-26 PROCEDURE — 99213 OFFICE O/P EST LOW 20 MIN: CPT | Performed by: INTERNAL MEDICINE

## 2020-05-26 NOTE — PROGRESS NOTES
The patient is a 42-year-old male who I know well from prior evaluation comes in now for follow-up. 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   He notes that he has had some ups and downs with respect to his breathin

## 2020-06-01 RX ORDER — FLUTICASONE FUROATE AND VILANTEROL 100; 25 UG/1; UG/1
1 POWDER RESPIRATORY (INHALATION)
COMMUNITY
Start: 2020-05-26

## 2020-06-05 ENCOUNTER — OFFICE VISIT (OUTPATIENT)
Dept: CARDIOLOGY | Age: 45
End: 2020-06-05

## 2020-06-05 VITALS
OXYGEN SATURATION: 99 % | WEIGHT: 216 LBS | HEIGHT: 73 IN | BODY MASS INDEX: 28.63 KG/M2 | DIASTOLIC BLOOD PRESSURE: 74 MMHG | HEART RATE: 77 BPM | SYSTOLIC BLOOD PRESSURE: 144 MMHG

## 2020-06-05 DIAGNOSIS — R00.2 PALPITATIONS: ICD-10-CM

## 2020-06-05 DIAGNOSIS — I42.1 HOCM (HYPERTROPHIC OBSTRUCTIVE CARDIOMYOPATHY) (CMD): Primary | ICD-10-CM

## 2020-06-05 PROCEDURE — 99214 OFFICE O/P EST MOD 30 MIN: CPT | Performed by: INTERNAL MEDICINE

## 2020-06-05 PROCEDURE — 93000 ELECTROCARDIOGRAM COMPLETE: CPT | Performed by: INTERNAL MEDICINE

## 2020-06-05 SDOH — HEALTH STABILITY: MENTAL HEALTH: HOW OFTEN DO YOU HAVE A DRINK CONTAINING ALCOHOL?: NEVER

## 2020-06-05 ASSESSMENT — PATIENT HEALTH QUESTIONNAIRE - PHQ9
1. LITTLE INTEREST OR PLEASURE IN DOING THINGS: NOT AT ALL
SUM OF ALL RESPONSES TO PHQ9 QUESTIONS 1 AND 2: 0
CLINICAL INTERPRETATION OF PHQ2 SCORE: NO FURTHER SCREENING NEEDED
SUM OF ALL RESPONSES TO PHQ9 QUESTIONS 1 AND 2: 0
CLINICAL INTERPRETATION OF PHQ9 SCORE: NO FURTHER SCREENING NEEDED
2. FEELING DOWN, DEPRESSED OR HOPELESS: NOT AT ALL

## 2020-06-10 DIAGNOSIS — I48.0 PAROXYSMAL ATRIAL FIBRILLATION (CMD): Primary | ICD-10-CM

## 2020-06-11 DIAGNOSIS — I48.0 PAROXYSMAL ATRIAL FIBRILLATION (CMD): ICD-10-CM

## 2020-06-11 PROCEDURE — 93000 ELECTROCARDIOGRAM COMPLETE: CPT | Performed by: INTERNAL MEDICINE

## 2020-07-17 ENCOUNTER — TELEPHONE (OUTPATIENT)
Dept: RHEUMATOLOGY | Facility: CLINIC | Age: 45
End: 2020-07-17

## 2020-07-17 NOTE — TELEPHONE ENCOUNTER
Last filled: 4/8/2020 #60 tab with 2 refills   LOV: 8/30/19   Future Appointments   Date Time Provider Ivania Sofie   7/29/2020 10:30 AM Bharat Martínez MD [facility]   5/27/2021  9:30 AM Maddy Leos [facility]     Labs: g/dL 3.6   Globulin      2.8 - 4.4 g/dL 2.7 (L)   A/G Ratio      1.0 - 2.0 1.3   Patient Fasting?        Yes     ASSESSMENT/PLAN:      This is a 40-year-old male with history of psoriasis and psoriatic arthritis presenting for follow-up visit     Psoriasis

## 2020-07-29 ENCOUNTER — OFFICE VISIT (OUTPATIENT)
Dept: FAMILY MEDICINE CLINIC | Facility: CLINIC | Age: 45
End: 2020-07-29
Payer: COMMERCIAL

## 2020-07-29 VITALS
BODY MASS INDEX: 27.77 KG/M2 | SYSTOLIC BLOOD PRESSURE: 136 MMHG | TEMPERATURE: 97 F | HEART RATE: 58 BPM | DIASTOLIC BLOOD PRESSURE: 71 MMHG | WEIGHT: 209.5 LBS | HEIGHT: 73 IN

## 2020-07-29 DIAGNOSIS — Z00.00 ANNUAL PHYSICAL EXAM: Primary | ICD-10-CM

## 2020-07-29 LAB
ALBUMIN SERPL-MCNC: 4.1 G/DL
ALBUMIN SERPL-MCNC: 4.1 G/DL (ref 3.4–5)
ALBUMIN/GLOB SERPL: 1.1 {RATIO}
ALBUMIN/GLOB SERPL: 1.1 {RATIO} (ref 1–2)
ALP LIVER SERPL-CCNC: 69 U/L (ref 45–117)
ALP SERPL-CCNC: 69 U/L
ALT SERPL-CCNC: 33 U/L (ref 16–61)
ALT SERPL-CCNC: 33 UNITS/L
ANION GAP SERPL CALC-SCNC: 7 MMOL/L
ANION GAP SERPL CALC-SCNC: 7 MMOL/L (ref 0–18)
AST SERPL-CCNC: 28 U/L (ref 15–37)
AST SERPL-CCNC: 28 UNITS/L
BASOPHILS # BLD AUTO: 0.04 X10(3) UL (ref 0–0.2)
BASOPHILS NFR BLD AUTO: 0.6 %
BILIRUB SERPL-MCNC: 0.8 MG/DL
BILIRUB SERPL-MCNC: 0.8 MG/DL (ref 0.1–2)
BUN BLD-MCNC: 22 MG/DL (ref 7–18)
BUN SERPL-MCNC: 22 MG/DL
BUN/CREAT SERPL: 19.6
BUN/CREAT SERPL: 19.6 (ref 10–20)
CALCIUM BLD-MCNC: 9.5 MG/DL (ref 8.5–10.1)
CALCIUM SERPL-MCNC: 9.5 MG/DL
CHLORIDE SERPL-SCNC: 108 MMOL/L
CHLORIDE SERPL-SCNC: 108 MMOL/L (ref 98–112)
CHOLEST SERPL-MCNC: 236 MG/DL
CHOLEST SMN-MCNC: 236 MG/DL (ref ?–200)
CO2 SERPL-SCNC: 25 MMOL/L
CO2 SERPL-SCNC: 25 MMOL/L (ref 21–32)
COMPLEXED PSA SERPL-MCNC: 1.22 NG/ML (ref ?–4)
CREAT BLD-MCNC: 1.12 MG/DL (ref 0.7–1.3)
CREAT SERPL-MCNC: 1.12 MG/DL
DEPRECATED RDW RBC AUTO: 44 FL (ref 35.1–46.3)
EOSINOPHIL # BLD AUTO: 0.12 X10(3) UL (ref 0–0.7)
EOSINOPHIL NFR BLD AUTO: 1.8 %
ERYTHROCYTE [DISTWIDTH] IN BLOOD BY AUTOMATED COUNT: 13 % (ref 11–15)
GLOBULIN PLAS-MCNC: 3.6 G/DL (ref 2.8–4.4)
GLOBULIN SER-MCNC: 3.6 G/DL
GLUCOSE BLD-MCNC: 77 MG/DL (ref 70–99)
GLUCOSE SERPL-MCNC: 77 MG/DL
HCT VFR BLD AUTO: 47.6 % (ref 39–53)
HCT VFR BLD CALC: 47.6 %
HDLC SERPL-MCNC: 64 MG/DL
HDLC SERPL-MCNC: 64 MG/DL (ref 40–59)
HGB BLD-MCNC: 15.3 G/DL
HGB BLD-MCNC: 15.3 G/DL (ref 13–17.5)
IMM GRANULOCYTES # BLD AUTO: 0.01 X10(3) UL (ref 0–1)
IMM GRANULOCYTES NFR BLD: 0.2 %
LDLC SERPL CALC-MCNC: 164 MG/DL
LDLC SERPL CALC-MCNC: 164 MG/DL (ref ?–100)
LYMPHOCYTES # BLD AUTO: 2.28 X10(3) UL (ref 1–4)
LYMPHOCYTES NFR BLD AUTO: 34.8 %
M PROTEIN MFR SERPL ELPH: 7.7 G/DL (ref 6.4–8.2)
MCH RBC QN AUTO: 29.5 PG (ref 26–34)
MCHC RBC AUTO-ENTMCNC: 32.1 G/DL (ref 31–37)
MCV RBC AUTO: 91.9 FL (ref 80–100)
MONOCYTES # BLD AUTO: 0.58 X10(3) UL (ref 0.1–1)
MONOCYTES NFR BLD AUTO: 8.9 %
NEUTROPHILS # BLD AUTO: 3.52 X10 (3) UL (ref 1.5–7.7)
NEUTROPHILS # BLD AUTO: 3.52 X10(3) UL (ref 1.5–7.7)
NEUTROPHILS NFR BLD AUTO: 53.7 %
NONHDLC SERPL-MCNC: 172 MG/DL
NONHDLC SERPL-MCNC: 172 MG/DL (ref ?–130)
OSMOLALITY SERPL CALC.SUM OF ELEC: 292 MOSM/KG (ref 275–295)
PATIENT FASTING Y/N/NP: YES
PATIENT FASTING Y/N/NP: YES
PLATELET # BLD AUTO: 250 10(3)UL (ref 150–450)
POTASSIUM SERPL-SCNC: 4.5 MMOL/L
POTASSIUM SERPL-SCNC: 4.5 MMOL/L (ref 3.5–5.1)
PROT SERPL-MCNC: 7.7 G/DL
RBC # BLD AUTO: 5.18 X10(6)UL (ref 4.3–5.7)
RBC # BLD: 5.18 10*6/UL
SODIUM SERPL-SCNC: 140 MMOL/L
SODIUM SERPL-SCNC: 140 MMOL/L (ref 136–145)
TESTOST SERPL-MCNC: 587.4 NG/DL
TRIGL SERPL-MCNC: 41 MG/DL
TRIGL SERPL-MCNC: 41 MG/DL (ref 30–149)
TSH SERPL-ACNC: 1.2 MCUNITS/ML
TSI SER-ACNC: 1.2 MIU/ML (ref 0.36–3.74)
VLDLC SERPL CALC-MCNC: 8 MG/DL
VLDLC SERPL CALC-MCNC: 8 MG/DL (ref 0–30)
WBC # BLD AUTO: 6.6 X10(3) UL (ref 4–11)
WBC # BLD: 6.6 K/MCL

## 2020-07-29 PROCEDURE — 3078F DIAST BP <80 MM HG: CPT | Performed by: FAMILY MEDICINE

## 2020-07-29 PROCEDURE — 3075F SYST BP GE 130 - 139MM HG: CPT | Performed by: FAMILY MEDICINE

## 2020-07-29 PROCEDURE — 99396 PREV VISIT EST AGE 40-64: CPT | Performed by: FAMILY MEDICINE

## 2020-07-29 PROCEDURE — 3008F BODY MASS INDEX DOCD: CPT | Performed by: FAMILY MEDICINE

## 2020-07-29 NOTE — PROGRESS NOTES
HPI:    Bria Arguello is a 39year old male presents to clinic for annual physical exam.  No acute concerns/changes in health. Normal appetite, tries to eat healthy foods. Normal bowel movements and urination. Normal sleep habits.   Exercises re daily.     • aspirin 81 MG Oral Tab Take 81 mg by mouth daily. • Multiple Vitamin (MULTI VITAMIN MENS) Oral Tab Take 1 tablet by mouth daily.          Allergies:  No Known Allergies      ROS:   Review of Systems   All other systems reviewed and are nega Comp Metabolic Panel (14) [E]      TSH W Reflex To Free T4 [E]      Lipid Panel [E]      PSA (Screening) [E]      Meds This Visit:  Requested Prescriptions      No prescriptions requested or ordered in this encounter       Imaging & Referrals:  None

## 2020-08-24 RX ORDER — MELOXICAM 15 MG/1
15 TABLET ORAL DAILY
Qty: 30 TABLET | Refills: 2 | Status: SHIPPED | OUTPATIENT
Start: 2020-08-24 | End: 2020-11-18

## 2020-08-24 NOTE — TELEPHONE ENCOUNTER
Last filled: 5/18/2020 #30 tab with 2 refills   LOV: 8/30/19  Future Appointments   Date Time Provider Ivania Carrizales   5/27/2021  9:30 AM Ej Gonsalves MD [facility]     Labs:   Component      Latest Ref Rng & Units 7/29/2020   WBC 1.0 - 2.0 1.1   Patient Fasting?        Yes       ASSESSMENT/PLAN:      This is a 77-year-old male with history of psoriasis and psoriatic arthritis presenting for follow-up visit     Psoriasis and psoriatic arthritis (polyarthritis and tendinitis)  - Ngoc Tarango

## 2020-08-25 NOTE — TELEPHONE ENCOUNTER
Called and spoke with patient, name and  was verified. Patient scheduled for 20 with Dr. Gina Norris at [facility].

## 2020-09-14 ENCOUNTER — TELEPHONE (OUTPATIENT)
Dept: RHEUMATOLOGY | Facility: CLINIC | Age: 45
End: 2020-09-14

## 2020-09-14 ENCOUNTER — OFFICE VISIT (OUTPATIENT)
Dept: RHEUMATOLOGY | Facility: CLINIC | Age: 45
End: 2020-09-14
Payer: COMMERCIAL

## 2020-09-14 VITALS
HEIGHT: 73 IN | WEIGHT: 208 LBS | BODY MASS INDEX: 27.57 KG/M2 | HEART RATE: 54 BPM | SYSTOLIC BLOOD PRESSURE: 131 MMHG | DIASTOLIC BLOOD PRESSURE: 80 MMHG

## 2020-09-14 DIAGNOSIS — Z51.81 MEDICATION MONITORING ENCOUNTER: ICD-10-CM

## 2020-09-14 DIAGNOSIS — L40.50 PSORIATIC ARTHRITIS (HCC): Primary | ICD-10-CM

## 2020-09-14 DIAGNOSIS — L40.9 PSORIASIS: ICD-10-CM

## 2020-09-14 PROCEDURE — 3079F DIAST BP 80-89 MM HG: CPT | Performed by: INTERNAL MEDICINE

## 2020-09-14 PROCEDURE — 3008F BODY MASS INDEX DOCD: CPT | Performed by: INTERNAL MEDICINE

## 2020-09-14 PROCEDURE — 3075F SYST BP GE 130 - 139MM HG: CPT | Performed by: INTERNAL MEDICINE

## 2020-09-14 PROCEDURE — 99214 OFFICE O/P EST MOD 30 MIN: CPT | Performed by: INTERNAL MEDICINE

## 2020-09-14 NOTE — PROGRESS NOTES
Ekta Trevino is a 39year old male. HPI:   Patient presents with: Follow - Up: Mario Pair f/u    I had the pleasure of seeing Ekta Trevino on 9/14/2020 for follow up Psoriasis and PsA.      Current Medications:  Otezla 30 mg twice day- started 1 route  every 4 - 6 hours as needed 1 Inhaler 5   • Zolpidem Tartrate 5 MG Oral Tab Take 1 tablet (5 mg total) by mouth nightly as needed for Sleep. 30 tablet 0   • Calcipotriene-Betameth Diprop 0.005-0.064 % External Ointment as needed.   2   • Cholecalcife g/dL 32.7   RDW-SD      35.1 - 46.3 fL 41.6   RDW      11.0 - 15.0 % 12.4   Platelet Count      583.2 - 450.0 10(3)uL 214.0   Prelim Neutrophil Abs      1.50 - 7.70 x10 (3) uL 3.12   Neutrophils Absolute      1.50 - 7.70 x10(3) uL 3.12   Lymphocytes Absolu regurgitation and right ventricle systolic pressure  Increased    Cardiac MRI:  1. Asymmetric septal hypertrophy variant of hypertrophic cardiomyopathy with hypertrophy of basal to mid septum having maximum end-diastolic wall thickness of 16mm.   No LV outf

## 2020-09-14 NOTE — PATIENT INSTRUCTIONS
You were seen today for psoriatic arthritis  Your symptoms are stable  Continue Otezla daily  Follow-up in 1 year

## 2020-09-25 ENCOUNTER — PATIENT MESSAGE (OUTPATIENT)
Dept: PULMONOLOGY | Facility: CLINIC | Age: 45
End: 2020-09-25

## 2020-09-30 ENCOUNTER — TELEPHONE (OUTPATIENT)
Dept: PULMONOLOGY | Facility: CLINIC | Age: 45
End: 2020-09-30

## 2020-09-30 NOTE — TELEPHONE ENCOUNTER
Prior authorization completed via [employer]. Will await PA response    Demetrio Merlos Key: [id number] – PA Case ID: [id number]?  Call us at [phone]  Status  [facility]  Drug  Breo Ellipta 100-25MCG/INH aerosol powder  Form  Expres

## 2020-09-30 NOTE — TELEPHONE ENCOUNTER
Current Outpatient Medications   Medication Sig Dispense Refill   • Fluticasone Furoate-Vilanterol (BREO ELLIPTA) 100-25 MCG/INH Inhalation Aerosol Powder, Breath Activated Inhale 1 puff into the lungs daily.  Follow with mouth rinse and spit 1 each 6     P

## 2020-10-01 NOTE — TELEPHONE ENCOUNTER
Aisha Bond Key: [id number] – PA Case ID: [id number] help? Call us at [phone]  Outcome Approved today  [id number]; Status:Approved; Review Type:Prior Auth; Coverage Start Date:08/31/2020; Coverage End Date:10/01/2023;  Drug Breo Ellipta 100

## 2020-10-02 NOTE — TELEPHONE ENCOUNTER
From: Jim Garnica Crystal  To: Rosalia Madera MD  Sent: 9/25/2020 4:52 PM CDT  Subject: [facility] Dr. Almeida Overall you are having a great week.  I recently switched insurance due to a job change and the [employer] inhaler is NOT covered wit

## 2020-11-18 RX ORDER — MELOXICAM 15 MG/1
15 TABLET ORAL DAILY
Qty: 90 TABLET | Refills: 0 | Status: SHIPPED | OUTPATIENT
Start: 2020-11-18 | End: 2021-02-23

## 2020-11-18 NOTE — TELEPHONE ENCOUNTER
*90 day supply*  Last filled: 8/24/2020 #30 tab with 2 refills   LOV: 9/14/20  Future Appointments   Date Time Provider Ivania Carrizales   5/27/2021  9:30 AM Ray Smith MD [facility]   9/15/2021  8:00 AM José Miguel Espinosa MD [address] [place] [address]

## 2020-11-19 DIAGNOSIS — R06.00 DYSPNEA ON EXERTION: ICD-10-CM

## 2020-11-19 RX ORDER — FLUTICASONE FUROATE AND VILANTEROL TRIFENATATE 100; 25 UG/1; UG/1
1 POWDER RESPIRATORY (INHALATION) DAILY
Qty: 3 EACH | Refills: 1 | Status: SHIPPED | OUTPATIENT
Start: 2020-11-19 | End: 2021-05-03

## 2020-11-19 NOTE — TELEPHONE ENCOUNTER
Last refill 5/26/20  Last office visit 5/26/20    Dr. Bret Spurling- Please review/sign pended refill request.

## 2020-11-19 NOTE — TELEPHONE ENCOUNTER
Prescription Request for 90 Day Supply    Current Outpatient Medications:   •  Fluticasone Furoate-Vilanterol (BREO ELLIPTA) 100-25 MCG/INH Inhalation Aerosol Powder, Breath Activated, Inhale 1 puff into the lungs daily.  Follow with mouth rinse and spit, D

## 2020-12-18 NOTE — TELEPHONE ENCOUNTER
Requested Prescriptions     Pending Prescriptions Disp Refills   • Apremilast (OTEZLA) 30 MG Oral Tab 60 tablet 2     Sig: Take 1 tablet by mouth 2 (two) times daily.      LF: 9/14/20 #60 TAB W/ 0 RF  LOV: 9/14/20   Future Appointments   Date Time Provider ALKALINE PHOSPHATASE      45 - 117 U/L 69   Total Bilirubin      0.1 - 2.0 mg/dL 0.8   TOTAL PROTEIN      6.4 - 8.2 g/dL 7.7   Albumin      3.4 - 5.0 g/dL 4.1   Globulin      2.8 - 4.4 g/dL 3.6   A/G Ratio      1.0 - 2.0 1.1   Patient Fasting?        Yes

## 2020-12-21 RX ORDER — APREMILAST 30 MG/1
1 TABLET, FILM COATED ORAL 2 TIMES DAILY
Qty: 60 TABLET | Refills: 2 | Status: SHIPPED | OUTPATIENT
Start: 2020-12-21 | End: 2021-03-11

## 2021-02-23 RX ORDER — MELOXICAM 15 MG/1
TABLET ORAL
Qty: 90 TABLET | Refills: 0 | Status: SHIPPED | OUTPATIENT
Start: 2021-02-23 | End: 2021-05-06

## 2021-03-10 NOTE — TELEPHONE ENCOUNTER
Last filled: 12/21/21 #60 tab with 2 refills  LOV: 9/14/20  Future Appointments   Date Time Provider vIania Carrizales   5/27/2021  9:30 AM Porsche Peng MD [facility]   9/15/2021  8:00 AM Allen Stratton MD [facility]       Please Steve Kay

## 2021-03-11 RX ORDER — APREMILAST 30 MG/1
1 TABLET, FILM COATED ORAL 2 TIMES DAILY
Qty: 60 TABLET | Refills: 2 | Status: SHIPPED | OUTPATIENT
Start: 2021-03-11 | End: 2021-06-11

## 2021-04-20 ENCOUNTER — IMMUNIZATION (OUTPATIENT)
Dept: LAB | Age: 46
End: 2021-04-20
Attending: HOSPITALIST
Payer: COMMERCIAL

## 2021-04-20 DIAGNOSIS — Z23 NEED FOR VACCINATION: Primary | ICD-10-CM

## 2021-04-20 PROCEDURE — 0001A SARSCOV2 VAC 30MCG/0.3ML IM: CPT

## 2021-05-02 DIAGNOSIS — R06.00 DYSPNEA ON EXERTION: ICD-10-CM

## 2021-05-03 RX ORDER — FLUTICASONE FUROATE AND VILANTEROL TRIFENATATE 100; 25 UG/1; UG/1
POWDER RESPIRATORY (INHALATION)
Qty: 180 EACH | Refills: 3 | Status: SHIPPED | OUTPATIENT
Start: 2021-05-03

## 2021-05-06 RX ORDER — MELOXICAM 15 MG/1
TABLET ORAL
Qty: 90 TABLET | Refills: 3 | Status: SHIPPED | OUTPATIENT
Start: 2021-05-06

## 2021-05-06 NOTE — TELEPHONE ENCOUNTER
Requested Prescriptions     Pending Prescriptions Disp Refills   • MELOXICAM 15 MG Oral Tab [Pharmacy Med Name: MELOXICAM TABS 15MG] 90 tablet 3     Sig: TAKE 1 TABLET DAILY     LF: 2/23/21 #90 TAB W/ 0 RF  LOV: 9/14/20   Future Appointments   Date Time Pr eGFR NON-AFR.  AMERICAN      >=60 79   eGFR AFRICAN AMERICAN      >=60 91   ALT (SGPT)      16 - 61 U/L 33   AST (SGOT)      15 - 37 U/L 28   ALKALINE PHOSPHATASE      45 - 117 U/L 69   Total Bilirubin      0.1 - 2.0 mg/dL 0.8   TOTAL PROTEIN      6.4 - 8

## 2021-05-11 ENCOUNTER — IMMUNIZATION (OUTPATIENT)
Dept: LAB | Age: 46
End: 2021-05-11
Attending: HOSPITALIST
Payer: COMMERCIAL

## 2021-05-11 DIAGNOSIS — Z23 NEED FOR VACCINATION: Primary | ICD-10-CM

## 2021-05-11 PROCEDURE — 0002A SARSCOV2 VAC 30MCG/0.3ML IM: CPT

## 2021-06-04 ENCOUNTER — OFFICE VISIT (OUTPATIENT)
Dept: CARDIOLOGY | Age: 46
End: 2021-06-04

## 2021-06-04 VITALS
DIASTOLIC BLOOD PRESSURE: 68 MMHG | HEIGHT: 73 IN | OXYGEN SATURATION: 99 % | BODY MASS INDEX: 26.24 KG/M2 | SYSTOLIC BLOOD PRESSURE: 138 MMHG | WEIGHT: 198 LBS | HEART RATE: 56 BPM

## 2021-06-04 DIAGNOSIS — I48.0 PAROXYSMAL ATRIAL FIBRILLATION (CMD): Primary | ICD-10-CM

## 2021-06-04 DIAGNOSIS — I42.1 HOCM (HYPERTROPHIC OBSTRUCTIVE CARDIOMYOPATHY) (CMD): ICD-10-CM

## 2021-06-04 DIAGNOSIS — R00.2 PALPITATIONS: ICD-10-CM

## 2021-06-04 PROCEDURE — 99214 OFFICE O/P EST MOD 30 MIN: CPT | Performed by: INTERNAL MEDICINE

## 2021-06-04 ASSESSMENT — PATIENT HEALTH QUESTIONNAIRE - PHQ9
2. FEELING DOWN, DEPRESSED OR HOPELESS: NOT AT ALL
CLINICAL INTERPRETATION OF PHQ9 SCORE: NO FURTHER SCREENING NEEDED
SUM OF ALL RESPONSES TO PHQ9 QUESTIONS 1 AND 2: 0
CLINICAL INTERPRETATION OF PHQ2 SCORE: NO FURTHER SCREENING NEEDED
1. LITTLE INTEREST OR PLEASURE IN DOING THINGS: NOT AT ALL
SUM OF ALL RESPONSES TO PHQ9 QUESTIONS 1 AND 2: 0

## 2021-06-09 DIAGNOSIS — R00.2 PALPITATIONS: ICD-10-CM

## 2021-06-09 PROCEDURE — 93000 ELECTROCARDIOGRAM COMPLETE: CPT | Performed by: INTERNAL MEDICINE

## 2021-06-11 ENCOUNTER — OFFICE VISIT (OUTPATIENT)
Dept: FAMILY MEDICINE CLINIC | Facility: CLINIC | Age: 46
End: 2021-06-11
Payer: COMMERCIAL

## 2021-06-11 ENCOUNTER — LAB ENCOUNTER (OUTPATIENT)
Dept: LAB | Age: 46
End: 2021-06-11
Attending: FAMILY MEDICINE
Payer: COMMERCIAL

## 2021-06-11 VITALS
HEIGHT: 73 IN | DIASTOLIC BLOOD PRESSURE: 80 MMHG | HEART RATE: 56 BPM | BODY MASS INDEX: 26.01 KG/M2 | SYSTOLIC BLOOD PRESSURE: 139 MMHG | WEIGHT: 196.25 LBS | TEMPERATURE: 97 F | RESPIRATION RATE: 17 BRPM

## 2021-06-11 DIAGNOSIS — Z00.00 ANNUAL PHYSICAL EXAM: Primary | ICD-10-CM

## 2021-06-11 DIAGNOSIS — Z00.00 ANNUAL PHYSICAL EXAM: ICD-10-CM

## 2021-06-11 PROCEDURE — 3008F BODY MASS INDEX DOCD: CPT | Performed by: FAMILY MEDICINE

## 2021-06-11 PROCEDURE — 84403 ASSAY OF TOTAL TESTOSTERONE: CPT

## 2021-06-11 PROCEDURE — 80061 LIPID PANEL: CPT

## 2021-06-11 PROCEDURE — 3075F SYST BP GE 130 - 139MM HG: CPT | Performed by: FAMILY MEDICINE

## 2021-06-11 PROCEDURE — 85025 COMPLETE CBC W/AUTO DIFF WBC: CPT

## 2021-06-11 PROCEDURE — 80053 COMPREHEN METABOLIC PANEL: CPT

## 2021-06-11 PROCEDURE — 99396 PREV VISIT EST AGE 40-64: CPT | Performed by: FAMILY MEDICINE

## 2021-06-11 PROCEDURE — 36415 COLL VENOUS BLD VENIPUNCTURE: CPT

## 2021-06-11 PROCEDURE — 3079F DIAST BP 80-89 MM HG: CPT | Performed by: FAMILY MEDICINE

## 2021-06-11 PROCEDURE — 84443 ASSAY THYROID STIM HORMONE: CPT

## 2021-06-11 RX ORDER — APREMILAST 30 MG/1
1 TABLET, FILM COATED ORAL 2 TIMES DAILY
Qty: 60 TABLET | Refills: 2 | Status: SHIPPED | OUTPATIENT
Start: 2021-06-11 | End: 2021-10-13

## 2021-06-11 NOTE — TELEPHONE ENCOUNTER
Last filled: 3/11/21  LOV: 9/14/20  Future Appointments   Date Time Provider Ivania Carrizales   6/29/2021  3:00 PM Tracey Mathews MD [facility]   9/15/2021  8:00 AM Aviva Kayser, MD 2014 [facility]     Labs:     ASSESSMENT/PLAN:      This

## 2021-06-11 NOTE — PROGRESS NOTES
HPI:    Lele Nam is a 55year old male presents to clinic for an annual physical exam.  No acute concerns or major changes in health. Normal appetite, tries to eat healthy foods, lots of protein. Normal bowel movements and urination.   Normal mouth daily. Allergies:  No Known Allergies      ROS:   Review of Systems   All other systems reviewed and are negative.       PHYSICAL EXAM:      06/11/21  0908   BP: 146/86   Pulse: 56   Resp: 17   Temp: 97.1 °F (36.2 °C)   TempSrc: Tympanic   Alleghany Saras information discussed. No barriers to learning observed.           Orders This Visit:  Orders Placed This Encounter      CBC W Differential W Platelet [E]      Comp Metabolic Panel (14) [E]      TSH W Reflex To Free T4 [E]      Lipid Panel [E]      Testoste

## 2021-06-29 ENCOUNTER — OFFICE VISIT (OUTPATIENT)
Dept: PULMONOLOGY | Facility: CLINIC | Age: 46
End: 2021-06-29
Payer: COMMERCIAL

## 2021-06-29 VITALS
OXYGEN SATURATION: 97 % | SYSTOLIC BLOOD PRESSURE: 152 MMHG | RESPIRATION RATE: 18 BRPM | DIASTOLIC BLOOD PRESSURE: 83 MMHG | WEIGHT: 208 LBS | HEIGHT: 73 IN | HEART RATE: 59 BPM | BODY MASS INDEX: 27.57 KG/M2

## 2021-06-29 DIAGNOSIS — R06.00 DYSPNEA ON EXERTION: Primary | ICD-10-CM

## 2021-06-29 PROCEDURE — 3008F BODY MASS INDEX DOCD: CPT | Performed by: INTERNAL MEDICINE

## 2021-06-29 PROCEDURE — 3079F DIAST BP 80-89 MM HG: CPT | Performed by: INTERNAL MEDICINE

## 2021-06-29 PROCEDURE — 3077F SYST BP >= 140 MM HG: CPT | Performed by: INTERNAL MEDICINE

## 2021-06-29 PROCEDURE — 99213 OFFICE O/P EST LOW 20 MIN: CPT | Performed by: INTERNAL MEDICINE

## 2021-06-29 NOTE — PROGRESS NOTES
The patient is a 44-year-old male who Ira from prior evaluation comes in now for follow-up. He does also now finds and will occasionally experience exercise-induced bronchospasm and may have mild intrinsic asthma as he benefits from [facility].

## 2021-08-17 ENCOUNTER — TELEPHONE (OUTPATIENT)
Dept: RHEUMATOLOGY | Facility: CLINIC | Age: 46
End: 2021-08-17

## 2021-08-17 NOTE — TELEPHONE ENCOUNTER
Alexandra HERNANDEZ request to renew otezla 30mg tabs  Key [id number]    Will [facility] up with [facility]. State plan does not cover medication and to call [facility] to continue.

## 2021-08-30 NOTE — TELEPHONE ENCOUNTER
Dr. Ariza is referring you to the following providers:  Please call their office(s) to schedule your appointment.  Dr. Kamlesh Hernandez  ENT (EARS/NOSE/THROAT)  [address] [place] [id number] [phone]      NEUROLOGIST  [id number] [place] [id number] [phone]     PA approval effective 10/9/2018 through 10/9/2019. Case #[id number]    Contacted [facility], per representative [facility] preferred pharmacy is [facility]. [place] script pended with updated pharmacy information.      Contacted pt to

## 2021-09-02 RX ORDER — APREMILAST 30 MG/1
30 TABLET, FILM COATED ORAL 2 TIMES DAILY
Qty: 60 TABLET | Refills: 5 | Status: CANCELLED | COMMUNITY
Start: 2021-09-02

## 2021-09-18 ENCOUNTER — OFFICE VISIT (OUTPATIENT)
Dept: RHEUMATOLOGY | Facility: CLINIC | Age: 46
End: 2021-09-18
Payer: COMMERCIAL

## 2021-09-18 VITALS
BODY MASS INDEX: 27.04 KG/M2 | WEIGHT: 204 LBS | HEIGHT: 73 IN | HEART RATE: 55 BPM | SYSTOLIC BLOOD PRESSURE: 144 MMHG | DIASTOLIC BLOOD PRESSURE: 82 MMHG

## 2021-09-18 DIAGNOSIS — Z51.81 MEDICATION MONITORING ENCOUNTER: ICD-10-CM

## 2021-09-18 DIAGNOSIS — L40.9 PSORIASIS: Primary | ICD-10-CM

## 2021-09-18 DIAGNOSIS — L40.50 PSORIATIC ARTHRITIS (HCC): ICD-10-CM

## 2021-09-18 PROCEDURE — 99214 OFFICE O/P EST MOD 30 MIN: CPT | Performed by: INTERNAL MEDICINE

## 2021-09-18 PROCEDURE — 3008F BODY MASS INDEX DOCD: CPT | Performed by: INTERNAL MEDICINE

## 2021-09-18 PROCEDURE — 3079F DIAST BP 80-89 MM HG: CPT | Performed by: INTERNAL MEDICINE

## 2021-09-18 PROCEDURE — 3077F SYST BP >= 140 MM HG: CPT | Performed by: INTERNAL MEDICINE

## 2021-09-18 NOTE — PROGRESS NOTES
Will Perera is a 55year old male. HPI:   Patient presents with: Follow - Up  Psoriatic Arthritis    I had the pleasure of seeing Will Perera on 9/18/2021 for follow up Psoriasis and PsA.      Current Medications:  Otezla 30 mg twice day- AND SPIT 180 each 3   • Albuterol Sulfate  (90 Base) MCG/ACT Inhalation Aero Soln inhale 2 puff by inhalation route  every 4 - 6 hours as needed 1 Inhaler 5   • Cholecalciferol (VITAMIN D) 2000 units Oral Cap Take by mouth daily.      • aspirin 81 MG Platelet Count      677.6 - 450.0 10(3)uL 214.0   Prelim Neutrophil Abs      1.50 - 7.70 x10 (3) uL 3.12   Neutrophils Absolute      1.50 - 7.70 x10(3) uL 3.12   Lymphocytes Absolute      1.00 - 4.00 x10(3) uL 1.83   Monocytes Absolute      0.10 - 1.00 x Asymmetric septal hypertrophy variant of hypertrophic cardiomyopathy with hypertrophy of basal to mid septum having maximum end-diastolic wall thickness of 16mm. No LV outflow tract obstruction.   Patchy fibrosis in hypertrophied inferoseptal segment   and

## 2021-10-04 ENCOUNTER — HOSPITAL ENCOUNTER (OUTPATIENT)
Age: 46
Discharge: HOME | End: 2021-10-04
Payer: COMMERCIAL

## 2021-10-04 ENCOUNTER — APPOINTMENT (OUTPATIENT)
Dept: GENERAL RADIOLOGY | Age: 46
End: 2021-10-04
Attending: NURSE PRACTITIONER
Payer: COMMERCIAL

## 2021-10-04 VITALS
SYSTOLIC BLOOD PRESSURE: 150 MMHG | HEART RATE: 64 BPM | DIASTOLIC BLOOD PRESSURE: 78 MMHG | TEMPERATURE: 98 F | OXYGEN SATURATION: 99 % | RESPIRATION RATE: 18 BRPM

## 2021-10-04 DIAGNOSIS — M79.671 FOOT PAIN, BILATERAL: Primary | ICD-10-CM

## 2021-10-04 DIAGNOSIS — M72.2 PLANTAR FASCIITIS: ICD-10-CM

## 2021-10-04 DIAGNOSIS — M79.672 FOOT PAIN, BILATERAL: Primary | ICD-10-CM

## 2021-10-04 PROCEDURE — 73630 X-RAY EXAM OF FOOT: CPT | Performed by: NURSE PRACTITIONER

## 2021-10-04 PROCEDURE — 99203 OFFICE O/P NEW LOW 30 MIN: CPT | Performed by: NURSE PRACTITIONER

## 2021-10-04 NOTE — ED INITIAL ASSESSMENT (HPI)
Pt ran an ultra marathon(100miles) this weekend and is here with  Bilateral foot pain. More pain on the left foot. Limited ROM to left foot. Positive pedal pulse.

## 2021-10-04 NOTE — ED PROVIDER NOTES
Patient Seen in: [facility]      History   Patient presents with:  Musculoskeletal Problem    Stated Complaint: Foot injury    Subjective:   Well-appearing 30-year-old male presents with complaints of left plantar foot pain since yesterday a Physical Exam   VS: Vital signs reviewed. 02 saturation within normal limits for this patient. General: Patient is awake and alert, oriented to person, place and time. Pt appears non-toxic. HEENT: Head is normocephalic, atraumatic.  Nonicteric appears within normal limits.  There is a small plantar heel spur. SOFT TISSUES: No focal soft tissue swelling is appreciated by x-ray. EFFUSION: None visible.    OTHER: Negative.       Impression  CONCLUSION:       No evidence for acute osseous abnorma well as return/ED precautions.   Disposition and Plan     Clinical Impression:  Foot pain, bilateral  (primary encounter diagnosis)  Plantar fasciitis     Disposition:  Discharge  10/4/2021 10:22 am    Follow-up:  MD Parminder Yin

## 2021-10-13 ENCOUNTER — PATIENT MESSAGE (OUTPATIENT)
Dept: RHEUMATOLOGY | Facility: CLINIC | Age: 46
End: 2021-10-13

## 2021-10-13 RX ORDER — APREMILAST 30 MG/1
1 TABLET, FILM COATED ORAL 2 TIMES DAILY
Qty: 60 TABLET | Refills: 2 | Status: SHIPPED | OUTPATIENT
Start: 2021-10-13 | End: 2022-01-25

## 2021-10-13 NOTE — TELEPHONE ENCOUNTER
From: Judith Koehler Issue  To: Deyanira Theodore MD  Sent: 10/13/2021 9:35 AM CDT  Subject: Mir Hilton you are having a great week.  When I was in for my annual follow-up I mentioned the difficulties I've been having getting my Ote

## 2021-10-13 NOTE — TELEPHONE ENCOUNTER
Spoke with patient and he states [facility] said there was an issue on his account but would not say what regarding refill of his Theone Juan Carlos. Refill request sent to provider. Informed him we will contact him if any issues arise.

## 2021-10-13 NOTE — TELEPHONE ENCOUNTER
LOV: 9/18/21  Last Refilled:#60, 2rfs 6/11/21    Future Appointments   Date Time Provider Ivania Carrizales   6/30/2022  9:45 AM Kai Vaughan MD [facility]   9/19/2022  8:40 AM Sayda Lazar MD [address] [place] [address] [facility] OF [facility]         ASSESSMENT/PLAN:

## 2021-10-26 ENCOUNTER — PATIENT MESSAGE (OUTPATIENT)
Dept: FAMILY MEDICINE CLINIC | Facility: CLINIC | Age: 46
End: 2021-10-26

## 2021-10-27 ENCOUNTER — TELEPHONE (OUTPATIENT)
Dept: FAMILY MEDICINE CLINIC | Facility: CLINIC | Age: 46
End: 2021-10-27

## 2021-10-27 DIAGNOSIS — G89.29 CHRONIC PAIN OF RIGHT KNEE: Primary | ICD-10-CM

## 2021-10-27 DIAGNOSIS — M25.561 CHRONIC PAIN OF RIGHT KNEE: Primary | ICD-10-CM

## 2021-10-27 NOTE — TELEPHONE ENCOUNTER
From: Ermias Estrella [place]  To: Flori Claudio MD  Sent: 10/26/2021 1:40 PM CDT  Subject: Orthopedic referral    [facility] Dr. Vikram Diaz you are having a great week.  Can provide me a referral for an orthopedic specialist? The issue with my right knee pauline

## 2021-10-27 NOTE — TELEPHONE ENCOUNTER
----- Message from Rishabh Joshi Dewey sent at 10/26/2021  1:40 PM CDT -----  Regarding: Orthopedic referral  Hannah Masterson Bowels you are having a great week.  Can provide me a referral for an orthopedic specialist? The issue with my right knee that

## 2021-10-28 NOTE — TELEPHONE ENCOUNTER
Spoke with patient RE: Marisabel message below--denies new injury, but last orthopedist given to him by Dr. Gisell Anguiano is no longer in [facility].     Patient aware he does not need formal referral (PPO), but asking for specific recommendation of which orthope

## 2022-01-25 RX ORDER — APREMILAST 30 MG/1
1 TABLET, FILM COATED ORAL 2 TIMES DAILY
Qty: 60 TABLET | Refills: 2 | Status: SHIPPED | OUTPATIENT
Start: 2022-01-25

## 2022-01-25 NOTE — TELEPHONE ENCOUNTER
Last filled: 10/13/21 #60 tab with 2 refills   LOV: 9/18/21  Future Appointments   Date Time Provider Ivania Carrizales   6/30/2022  9:45 AM Eric Barger [facility]   9/19/2022  8:40 AM Hyman Gitelman, MD 2014 [facility]

## 2022-03-25 ENCOUNTER — TELEPHONE (OUTPATIENT)
Dept: FAMILY MEDICINE CLINIC | Facility: CLINIC | Age: 47
End: 2022-03-25

## 2022-03-25 NOTE — TELEPHONE ENCOUNTER
Patient is requesting via my chart a recommendation for a provider for scheduling a colonoscopy. Patient has been given the GI Dept scheduling # but would like a recommendation from PCP. Please advise.

## 2022-05-02 RX ORDER — FLUTICASONE FUROATE AND VILANTEROL TRIFENATATE 100; 25 UG/1; UG/1
POWDER RESPIRATORY (INHALATION)
Qty: 180 EACH | Refills: 3 | Status: SHIPPED | OUTPATIENT
Start: 2022-05-02

## 2022-05-02 RX ORDER — MELOXICAM 15 MG/1
15 TABLET ORAL DAILY
Qty: 90 TABLET | Refills: 0 | Status: SHIPPED | OUTPATIENT
Start: 2022-05-02 | End: 2022-08-01

## 2022-05-02 NOTE — TELEPHONE ENCOUNTER
LOV: 6/29/21  NOV: 6/20/22  Last refill: 5/3/21      Dr. Huan Ignacio -please review and sign pended order if agreeable.

## 2022-05-02 NOTE — TELEPHONE ENCOUNTER
Last filled: 5/6/21 #90 tab with 3 refills  LOV: 9/18/21  Future Appointments   Date Time Provider Ivania Carrizales   6/30/2022  9:45 AM Gaetano Haq MD [facility]   9/19/2022  8:40 AM Rebecca Cox MD [address] [place] [address]

## 2022-05-09 ENCOUNTER — PATIENT MESSAGE (OUTPATIENT)
Dept: PULMONOLOGY | Facility: CLINIC | Age: 47
End: 2022-05-09

## 2022-05-09 RX ORDER — APREMILAST 30 MG/1
1 TABLET, FILM COATED ORAL 2 TIMES DAILY
Qty: 60 TABLET | Refills: 2 | Status: SHIPPED | OUTPATIENT
Start: 2022-05-09

## 2022-05-09 RX ORDER — FLUTICASONE FUROATE AND VILANTEROL TRIFENATATE 100; 25 UG/1; UG/1
1 POWDER RESPIRATORY (INHALATION) DAILY
Qty: 60 EACH | Refills: 3 | Status: SHIPPED | OUTPATIENT
Start: 2022-05-09

## 2022-05-09 NOTE — TELEPHONE ENCOUNTER
----- Message from lAan Perez sent at 5/6/2022  4:25 PM CDT -----  Regarding: Presecription refill  Helilia Kinney you are having a great 2022. We've recently switched insurance and I no longer need to get my Breo refilled through [facility] for a 3 month supply. Can you put through a new prescription to the [facility] [place] for the 1 month supply with however many refills? Any questions please let me know.     Thank you,  Shantel Nunez

## 2022-05-09 NOTE — TELEPHONE ENCOUNTER
Pt changed insurance where it don't require 3 months supply and need refill for Breo to go to [facility]. He canceled the 5/2/22 refill request.    LOV 6/29/21  Last refill  5/2/22 (canceled)    Please sign off order if agreeable.

## 2022-05-11 ENCOUNTER — PATIENT MESSAGE (OUTPATIENT)
Dept: RHEUMATOLOGY | Facility: CLINIC | Age: 47
End: 2022-05-11

## 2022-05-11 ENCOUNTER — TELEPHONE (OUTPATIENT)
Dept: RHEUMATOLOGY | Facility: CLINIC | Age: 47
End: 2022-05-11

## 2022-05-11 NOTE — TELEPHONE ENCOUNTER
Jethro PA form received to complete for [facility]. Pt contacted and insurance has changed. Pt will upload copy of cards to My Chart so PA can be completed.

## 2022-05-11 NOTE — TELEPHONE ENCOUNTER
From: Jenna Perez  To: Bibiana Sharp MD  Sent: 5/11/2022 1:41 PM CDT  Subject: new insurance details    Please find the new insurance details as requested. Any questions please let me know.     Thank you,  Evelio Lopez

## 2022-05-12 NOTE — TELEPHONE ENCOUNTER
[facility] is calling to follow up on status of prior Adaline Delfino     Ph. [phone] opt 3   Ref# [id number]

## 2022-05-26 NOTE — TELEPHONE ENCOUNTER
Jethro contacted and PA approved #[id number] from 5/11/22 to 5/12/2023. Pharmacy contacted and made aware.

## 2022-06-07 ENCOUNTER — MED REC SCAN ONLY (OUTPATIENT)
Dept: FAMILY MEDICINE CLINIC | Facility: CLINIC | Age: 47
End: 2022-06-07

## 2022-08-01 RX ORDER — MELOXICAM 15 MG/1
TABLET ORAL
Qty: 90 TABLET | Refills: 0 | Status: SHIPPED | OUTPATIENT
Start: 2022-08-01

## 2022-08-08 RX ORDER — APREMILAST 30 MG/1
1 TABLET, FILM COATED ORAL 2 TIMES DAILY
Qty: 60 TABLET | Refills: 2 | Status: SHIPPED | OUTPATIENT
Start: 2022-08-08

## 2022-08-22 ENCOUNTER — APPOINTMENT (OUTPATIENT)
Dept: GENERAL RADIOLOGY | Age: 47
End: 2022-08-22
Attending: NURSE PRACTITIONER
Payer: COMMERCIAL

## 2022-08-22 ENCOUNTER — HOSPITAL ENCOUNTER (OUTPATIENT)
Age: 47
Discharge: HOME | End: 2022-08-22
Payer: COMMERCIAL

## 2022-08-22 VITALS
RESPIRATION RATE: 20 BRPM | SYSTOLIC BLOOD PRESSURE: 151 MMHG | HEART RATE: 59 BPM | OXYGEN SATURATION: 99 % | DIASTOLIC BLOOD PRESSURE: 85 MMHG | TEMPERATURE: 98 F

## 2022-08-22 DIAGNOSIS — R05.1 ACUTE COUGH: Primary | ICD-10-CM

## 2022-08-22 PROCEDURE — 71046 X-RAY EXAM CHEST 2 VIEWS: CPT | Performed by: NURSE PRACTITIONER

## 2022-08-22 PROCEDURE — 99213 OFFICE O/P EST LOW 20 MIN: CPT | Performed by: NURSE PRACTITIONER

## 2022-08-22 NOTE — ED INITIAL ASSESSMENT (HPI)
Per patient was COVID + 3 weeks ago and recovered, for the last 4 days he c/o a productive cough with yellowish, malodorous sputum and exertional SOB.  Denies fever

## 2022-08-24 ENCOUNTER — LAB ENCOUNTER (OUTPATIENT)
Dept: LAB | Age: 47
End: 2022-08-24
Attending: FAMILY MEDICINE
Payer: COMMERCIAL

## 2022-08-24 ENCOUNTER — OFFICE VISIT (OUTPATIENT)
Dept: FAMILY MEDICINE CLINIC | Facility: CLINIC | Age: 47
End: 2022-08-24
Payer: COMMERCIAL

## 2022-08-24 VITALS
RESPIRATION RATE: 18 BRPM | HEIGHT: 73 IN | OXYGEN SATURATION: 99 % | DIASTOLIC BLOOD PRESSURE: 84 MMHG | HEART RATE: 72 BPM | WEIGHT: 216 LBS | SYSTOLIC BLOOD PRESSURE: 163 MMHG | BODY MASS INDEX: 28.63 KG/M2

## 2022-08-24 DIAGNOSIS — Z00.00 ANNUAL PHYSICAL EXAM: ICD-10-CM

## 2022-08-24 DIAGNOSIS — I10 ESSENTIAL HYPERTENSION: ICD-10-CM

## 2022-08-24 DIAGNOSIS — Z12.11 SCREENING FOR COLON CANCER: ICD-10-CM

## 2022-08-24 DIAGNOSIS — Z00.00 ANNUAL PHYSICAL EXAM: Primary | ICD-10-CM

## 2022-08-24 LAB
ALBUMIN SERPL-MCNC: 3.6 G/DL (ref 3.4–5)
ALBUMIN/GLOB SERPL: 1.1 {RATIO} (ref 1–2)
ALP LIVER SERPL-CCNC: 70 U/L
ALT SERPL-CCNC: 42 U/L
ANION GAP SERPL CALC-SCNC: 5 MMOL/L (ref 0–18)
AST SERPL-CCNC: 23 U/L (ref 15–37)
BILIRUB SERPL-MCNC: 0.7 MG/DL (ref 0.1–2)
BUN BLD-MCNC: 21 MG/DL (ref 7–18)
BUN/CREAT SERPL: 21.9 (ref 10–20)
CALCIUM BLD-MCNC: 9.1 MG/DL (ref 8.5–10.1)
CHLORIDE SERPL-SCNC: 108 MMOL/L (ref 98–112)
CHOLEST SERPL-MCNC: 178 MG/DL (ref ?–200)
CO2 SERPL-SCNC: 27 MMOL/L (ref 21–32)
CREAT BLD-MCNC: 0.96 MG/DL
FASTING PATIENT LIPID ANSWER: YES
FASTING STATUS PATIENT QL REPORTED: YES
GFR SERPLBLD BASED ON 1.73 SQ M-ARVRAT: 98 ML/MIN/1.73M2 (ref 60–?)
GLOBULIN PLAS-MCNC: 3.4 G/DL (ref 2.8–4.4)
GLUCOSE BLD-MCNC: 86 MG/DL (ref 70–99)
HDLC SERPL-MCNC: 52 MG/DL (ref 40–59)
LDLC SERPL CALC-MCNC: 119 MG/DL (ref ?–100)
NONHDLC SERPL-MCNC: 126 MG/DL (ref ?–130)
OSMOLALITY SERPL CALC.SUM OF ELEC: 292 MOSM/KG (ref 275–295)
POTASSIUM SERPL-SCNC: 4.1 MMOL/L (ref 3.5–5.1)
PROT SERPL-MCNC: 7 G/DL (ref 6.4–8.2)
SODIUM SERPL-SCNC: 140 MMOL/L (ref 136–145)
TRIGL SERPL-MCNC: 32 MG/DL (ref 30–149)
TSI SER-ACNC: 1.46 MIU/ML (ref 0.36–3.74)
VLDLC SERPL CALC-MCNC: 6 MG/DL (ref 0–30)

## 2022-08-24 PROCEDURE — 84443 ASSAY THYROID STIM HORMONE: CPT

## 2022-08-24 PROCEDURE — 3079F DIAST BP 80-89 MM HG: CPT | Performed by: FAMILY MEDICINE

## 2022-08-24 PROCEDURE — 3008F BODY MASS INDEX DOCD: CPT | Performed by: FAMILY MEDICINE

## 2022-08-24 PROCEDURE — 3077F SYST BP >= 140 MM HG: CPT | Performed by: FAMILY MEDICINE

## 2022-08-24 PROCEDURE — 80053 COMPREHEN METABOLIC PANEL: CPT

## 2022-08-24 PROCEDURE — 36415 COLL VENOUS BLD VENIPUNCTURE: CPT

## 2022-08-24 PROCEDURE — 99396 PREV VISIT EST AGE 40-64: CPT | Performed by: FAMILY MEDICINE

## 2022-08-24 PROCEDURE — 80061 LIPID PANEL: CPT

## 2022-08-24 RX ORDER — AMLODIPINE BESYLATE 5 MG/1
5 TABLET ORAL DAILY
Qty: 90 TABLET | Refills: 0 | Status: SHIPPED | OUTPATIENT
Start: 2022-08-24

## 2022-08-25 ENCOUNTER — TELEPHONE (OUTPATIENT)
Dept: PULMONOLOGY | Facility: CLINIC | Age: 47
End: 2022-08-25

## 2022-08-25 NOTE — TELEPHONE ENCOUNTER
Pharmacy is requesting a prior auth for   FLUTIC/VILAN 100-25MCG ORAL INH 30    Plan does not cover this medication.  pls call plan at [phone], pls call or fax pharmacy     Patient id [id number]

## 2022-08-30 NOTE — TELEPHONE ENCOUNTER
Received approval determination for [facility], effective until 2/26/2023. Notified pt via Mitro message. Approval sent to scanning.

## 2022-09-09 ENCOUNTER — NURSE ONLY (OUTPATIENT)
Dept: GASTROENTEROLOGY | Facility: CLINIC | Age: 47
End: 2022-09-09

## 2022-09-09 DIAGNOSIS — Z12.11 SCREEN FOR COLON CANCER: Primary | ICD-10-CM

## 2022-09-09 NOTE — PROGRESS NOTES
Sallyann Saint,     Please provide orders/prep for Dr. Luba ramirez on routing messages until his return on 9/13/2022     Thank you! Called patient for a scheduled telephone colon screening. GI MD preference:    Insurance:  BLADIMIR  [facility] from: 6/11/2021   PCP visit on: 8/24/2022     First colonoscopy: yes     Anticoagulants: no   Diabetic Meds:  No   BP meds(Ace inhibitors/ARB's): no   Weight loss meds (phentermine/vyvanse): no   Iron supplement (RX/OTC): MVI   Height & Weight/BMI: 6'1\"/216lbs/28  Hx of Cardiac/CVA issues/(MI/Stroke): no   Devices Pacemaker/Defibrillator/Stents: no   Resp. Issues/Oxygen Use/ASIM/COPD: no   Issues w/Anesthesia: no     Symptoms (Y/N): no     Family history of colon cancer: no     Medications, pharmacy, and allergies verified with patient over the phone.

## 2022-09-14 NOTE — PROGRESS NOTES
Scheduling:  cln w/ iv or mac w/ Dr. Kimber Garsia  Dx: crc screening  trilyte split dose sent e-scribe    Nursing:  Htn-  Recommendation by PCP to f/u in 4 weeks to address htn. Due for visit end of Sept. Please make sure is addressed prior to cln.     Thanks,  C

## 2022-09-16 NOTE — PROGRESS NOTES
Scheduled for:  Colonoscopy-screen [address]    Provider Name:  Dr. Judith Carney  Date:  11/10/2022  Location:  [facility]   Sedation:  MAC  Time:  12:30 PM (pt is aware to arrive at 11:30 AM)  Prep:  Split dose trilyte   Meds/Allergies Reconciled?:  jean Garzon- reviewed   Diagnosis with codes:  Colorectal cancer screening Z12.11  Was patient informed to call insurance with codes (Y/N):  I confirmed Fly6 with this patient. Referral sent?:  Referral was sent at the time of electronic surgical scheduling. [facility] or [address] notified?:  I sent an electronic request to Endo Scheduling and received a confirmation today. Medication Orders:  DO NOT TAKE: Iron pills, herbal supplements, multi-vitamins, or diet medications (i.e. Phentermine/Vyvanse) for 7 days before exam.  Misc Orders:  Patient was informed that they will need a COVID 19 test prior to their procedure. Patient verbally understood & will await a phone call from [facility] to schedule. Further instructions given by staff:  I discussed the prep instructions with the patient which he verbally understood and is aware that I will send the instructions today via [address]

## 2022-09-16 NOTE — PROGRESS NOTES
GI clinical-     Please ensure patient f/u w/ PCP at the end of Sept. for htn. Patient scheduled to see PCP on 9/21/2022    Scheduled for cln on 11/10/2022 w/GS-    May close TE when appropriate. Thank you!

## 2022-09-19 ENCOUNTER — LAB ENCOUNTER (OUTPATIENT)
Dept: LAB | Facility: HOSPITAL | Age: 47
End: 2022-09-19
Attending: INTERNAL MEDICINE

## 2022-09-19 ENCOUNTER — OFFICE VISIT (OUTPATIENT)
Dept: RHEUMATOLOGY | Facility: CLINIC | Age: 47
End: 2022-09-19
Payer: COMMERCIAL

## 2022-09-19 VITALS
HEIGHT: 73 IN | DIASTOLIC BLOOD PRESSURE: 85 MMHG | WEIGHT: 213 LBS | SYSTOLIC BLOOD PRESSURE: 145 MMHG | BODY MASS INDEX: 28.23 KG/M2 | HEART RATE: 56 BPM

## 2022-09-19 DIAGNOSIS — L40.50 PSORIATIC ARTHRITIS (HCC): ICD-10-CM

## 2022-09-19 DIAGNOSIS — L40.9 PSORIASIS: Primary | ICD-10-CM

## 2022-09-19 PROBLEM — J44.9 CHRONIC OBSTRUCTIVE PULMONARY DISEASE, UNSPECIFIED (HCC): Status: ACTIVE | Noted: 2022-09-19

## 2022-09-19 LAB
BASOPHILS # BLD AUTO: 0.03 X10(3) UL (ref 0–0.2)
BASOPHILS NFR BLD AUTO: 0.4 %
DEPRECATED RDW RBC AUTO: 42.5 FL (ref 35.1–46.3)
EOSINOPHIL # BLD AUTO: 0.17 X10(3) UL (ref 0–0.7)
EOSINOPHIL NFR BLD AUTO: 2.5 %
ERYTHROCYTE [DISTWIDTH] IN BLOOD BY AUTOMATED COUNT: 12.7 % (ref 11–15)
HCT VFR BLD AUTO: 45.7 %
HGB BLD-MCNC: 14.6 G/DL
IMM GRANULOCYTES # BLD AUTO: 0.01 X10(3) UL (ref 0–1)
IMM GRANULOCYTES NFR BLD: 0.1 %
LYMPHOCYTES # BLD AUTO: 1.89 X10(3) UL (ref 1–4)
LYMPHOCYTES NFR BLD AUTO: 27.9 %
MCH RBC QN AUTO: 29.1 PG (ref 26–34)
MCHC RBC AUTO-ENTMCNC: 31.9 G/DL (ref 31–37)
MCV RBC AUTO: 91 FL
MONOCYTES # BLD AUTO: 0.65 X10(3) UL (ref 0.1–1)
MONOCYTES NFR BLD AUTO: 9.6 %
NEUTROPHILS # BLD AUTO: 4.03 X10 (3) UL (ref 1.5–7.7)
NEUTROPHILS # BLD AUTO: 4.03 X10(3) UL (ref 1.5–7.7)
NEUTROPHILS NFR BLD AUTO: 59.5 %
PLATELET # BLD AUTO: 244 10(3)UL (ref 150–450)
RBC # BLD AUTO: 5.02 X10(6)UL
WBC # BLD AUTO: 6.8 X10(3) UL (ref 4–11)

## 2022-09-19 PROCEDURE — 85025 COMPLETE CBC W/AUTO DIFF WBC: CPT | Performed by: INTERNAL MEDICINE

## 2022-09-19 PROCEDURE — 3079F DIAST BP 80-89 MM HG: CPT | Performed by: INTERNAL MEDICINE

## 2022-09-19 PROCEDURE — 3077F SYST BP >= 140 MM HG: CPT | Performed by: INTERNAL MEDICINE

## 2022-09-19 PROCEDURE — 99214 OFFICE O/P EST MOD 30 MIN: CPT | Performed by: INTERNAL MEDICINE

## 2022-09-19 PROCEDURE — 36415 COLL VENOUS BLD VENIPUNCTURE: CPT | Performed by: INTERNAL MEDICINE

## 2022-09-19 PROCEDURE — 3008F BODY MASS INDEX DOCD: CPT | Performed by: INTERNAL MEDICINE

## 2022-09-19 RX ORDER — SULFASALAZINE 500 MG/1
TABLET ORAL
Qty: 120 TABLET | Refills: 1 | Status: SHIPPED | OUTPATIENT
Start: 2022-09-19

## 2022-09-19 NOTE — PATIENT INSTRUCTIONS
You were seen today for psoriasis and psoriatic arthritis  Plan to stop [place]  Will start sulfasalazine, 500 mg twice a day for 2 weeks then increase to 1000 mg twice a day  Blood work today  Will have to follow-up blood work every 3 months  Next blood work in December  Can see me in 6 months

## 2022-09-21 ENCOUNTER — OFFICE VISIT (OUTPATIENT)
Dept: FAMILY MEDICINE CLINIC | Facility: CLINIC | Age: 47
End: 2022-09-21

## 2022-09-21 VITALS
HEART RATE: 78 BPM | RESPIRATION RATE: 18 BRPM | OXYGEN SATURATION: 98 % | BODY MASS INDEX: 28.49 KG/M2 | WEIGHT: 215 LBS | DIASTOLIC BLOOD PRESSURE: 86 MMHG | HEIGHT: 73 IN | SYSTOLIC BLOOD PRESSURE: 143 MMHG

## 2022-09-21 DIAGNOSIS — I10 ESSENTIAL HYPERTENSION: Primary | ICD-10-CM

## 2022-09-21 DIAGNOSIS — R60.0 LOWER EXTREMITY EDEMA: ICD-10-CM

## 2022-09-21 PROCEDURE — 99214 OFFICE O/P EST MOD 30 MIN: CPT | Performed by: FAMILY MEDICINE

## 2022-09-21 PROCEDURE — 3008F BODY MASS INDEX DOCD: CPT | Performed by: FAMILY MEDICINE

## 2022-09-21 PROCEDURE — 3077F SYST BP >= 140 MM HG: CPT | Performed by: FAMILY MEDICINE

## 2022-09-21 PROCEDURE — 3079F DIAST BP 80-89 MM HG: CPT | Performed by: FAMILY MEDICINE

## 2022-09-21 RX ORDER — LOSARTAN POTASSIUM 25 MG/1
25 TABLET ORAL DAILY
Qty: 90 TABLET | Refills: 0 | Status: SHIPPED | OUTPATIENT
Start: 2022-09-21

## 2022-09-26 NOTE — PROGRESS NOTES
Patient was seen by PCP on 9/21/22 to f/u for HTN. Started on Losartan 2 mg daily. Per progress note, BP has improved. Amlodipine stopped.

## 2022-10-31 RX ORDER — MELOXICAM 15 MG/1
TABLET ORAL
Qty: 90 TABLET | Refills: 3 | Status: SHIPPED | OUTPATIENT
Start: 2022-10-31

## 2022-11-01 ENCOUNTER — OFFICE VISIT (OUTPATIENT)
Dept: PULMONOLOGY | Facility: CLINIC | Age: 47
End: 2022-11-01
Payer: COMMERCIAL

## 2022-11-01 VITALS
HEART RATE: 61 BPM | BODY MASS INDEX: 28.49 KG/M2 | OXYGEN SATURATION: 98 % | HEIGHT: 73 IN | RESPIRATION RATE: 18 BRPM | WEIGHT: 215 LBS | SYSTOLIC BLOOD PRESSURE: 156 MMHG | DIASTOLIC BLOOD PRESSURE: 85 MMHG

## 2022-11-01 DIAGNOSIS — R06.09 DYSPNEA ON EXERTION: Primary | ICD-10-CM

## 2022-11-01 PROCEDURE — 3077F SYST BP >= 140 MM HG: CPT | Performed by: INTERNAL MEDICINE

## 2022-11-01 PROCEDURE — 3008F BODY MASS INDEX DOCD: CPT | Performed by: INTERNAL MEDICINE

## 2022-11-01 PROCEDURE — 99213 OFFICE O/P EST LOW 20 MIN: CPT | Performed by: INTERNAL MEDICINE

## 2022-11-01 PROCEDURE — 3079F DIAST BP 80-89 MM HG: CPT | Performed by: INTERNAL MEDICINE

## 2022-11-01 RX ORDER — ALBUTEROL SULFATE 90 UG/1
AEROSOL, METERED RESPIRATORY (INHALATION)
Qty: 1 EACH | Refills: 5 | Status: SHIPPED | OUTPATIENT
Start: 2022-11-01

## 2022-11-01 NOTE — PROGRESS NOTES
The patient is a 66-year-old male who I know well from prior evaluation comes in now for follow-up. He has exercise-induced bronchospasm and continues to pursue distance running. The expense of the Lomeli Abu is causing trouble. Review of Systems:  Vision normal. Ear nose and throat normal. Bowel normal. Bladder function normal. No depression. No thyroid disease. No lymphatic system concerns. No rash. Muscles and joints unremarkable. No weight loss no weight gain. Physical Examination:  Vital signs normal. HEENT examination is unremarkable with pupils equal round and reactive to light and accommodation. Neck without adenopathy, thyromegaly, JVD nor bruit. Lungs clear to auscultation and percussion. Cardiac regular rate and rhythm no murmur. Abdomen nontender, without hepatosplenomegaly and no mass appreciable. Extremities and Musculoskeletal without clubbing cyanosis nor edema, and mobility acceptable. Neurologic grossly intact with symmetric tone and strength and reflex. Assessment and plan:  1. Dyspnea on exertion-mild exercise-induced bronchospasm-doing well clinically but due to the expense he is seeking an alternative to Lomeli Abu. He will check with his pharmacy as to which of the alternative is the most economical.  He will call me with that information. Refilled his albuterol. Annual flu shot and COVID booster. Contact me promptly if new trouble and return at the 1 to 2-year interval or sooner if needed.

## 2022-11-07 ENCOUNTER — TELEPHONE (OUTPATIENT)
Facility: CLINIC | Age: 47
End: 2022-11-07

## 2022-11-08 RX ORDER — POLYETHYLENE GLYCOL 3350, SODIUM CHLORIDE, SODIUM BICARBONATE, POTASSIUM CHLORIDE 420; 11.2; 5.72; 1.48 G/4L; G/4L; G/4L; G/4L
POWDER, FOR SOLUTION ORAL
Qty: 1 EACH | Refills: 0 | Status: SHIPPED | OUTPATIENT
Start: 2022-11-08 | End: 2022-11-10

## 2022-11-09 ENCOUNTER — TELEPHONE (OUTPATIENT)
Dept: GASTROENTEROLOGY | Facility: CLINIC | Age: 47
End: 2022-11-09

## 2022-11-09 ENCOUNTER — TELEPHONE (OUTPATIENT)
Facility: CLINIC | Age: 47
End: 2022-11-09

## 2022-11-09 NOTE — TELEPHONE ENCOUNTER
Dr David Hubbard,    I spoke to the pt and let him know he can proceed with his colonoscopy tomorrow.     He does not need to cancel due to taking the multivitamins

## 2022-11-09 NOTE — TELEPHONE ENCOUNTER
Pt just called about his CLN for tomorrow at 11/10/2022. He just saw his instructions and realized he took his multivitamins all week. Wondering if they need to reschedule or if that's an issue.

## 2022-11-09 NOTE — TELEPHONE ENCOUNTER
I spoke to the pt and I answered all of his questions regarding his colonoscopy prep.  He was able to find the instructions on [email or web address] and view them    Pt voices understanding

## 2022-11-10 ENCOUNTER — ANESTHESIA EVENT (OUTPATIENT)
Dept: ENDOSCOPY | Age: 47
End: 2022-11-10
Payer: COMMERCIAL

## 2022-11-10 ENCOUNTER — ANESTHESIA (OUTPATIENT)
Dept: ENDOSCOPY | Age: 47
End: 2022-11-10
Payer: COMMERCIAL

## 2022-11-10 ENCOUNTER — HOSPITAL ENCOUNTER (OUTPATIENT)
Age: 47
Setting detail: HOSPITAL OUTPATIENT SURGERY
Discharge: HOME | End: 2022-11-10
Attending: INTERNAL MEDICINE | Admitting: INTERNAL MEDICINE
Payer: COMMERCIAL

## 2022-11-10 VITALS
OXYGEN SATURATION: 97 % | DIASTOLIC BLOOD PRESSURE: 91 MMHG | HEIGHT: 73 IN | RESPIRATION RATE: 14 BRPM | WEIGHT: 220 LBS | BODY MASS INDEX: 29.16 KG/M2 | HEART RATE: 57 BPM | SYSTOLIC BLOOD PRESSURE: 147 MMHG

## 2022-11-10 DIAGNOSIS — Z12.11 SCREEN FOR COLON CANCER: ICD-10-CM

## 2022-11-10 PROCEDURE — 99070 SPECIAL SUPPLIES PHYS/QHP: CPT | Performed by: INTERNAL MEDICINE

## 2022-11-10 PROCEDURE — 88305 TISSUE EXAM BY PATHOLOGIST: CPT | Performed by: INTERNAL MEDICINE

## 2022-11-10 PROCEDURE — 45380 COLONOSCOPY AND BIOPSY: CPT | Performed by: INTERNAL MEDICINE

## 2022-11-10 RX ORDER — SODIUM CHLORIDE, SODIUM LACTATE, POTASSIUM CHLORIDE, CALCIUM CHLORIDE 600; 310; 30; 20 MG/100ML; MG/100ML; MG/100ML; MG/100ML
INJECTION, SOLUTION INTRAVENOUS CONTINUOUS
OUTPATIENT
Start: 2022-11-10

## 2022-11-10 RX ORDER — NALOXONE HYDROCHLORIDE 0.4 MG/ML
80 INJECTION, SOLUTION INTRAMUSCULAR; INTRAVENOUS; SUBCUTANEOUS AS NEEDED
OUTPATIENT
Start: 2022-11-10 | End: 2022-11-10

## 2022-11-10 RX ORDER — LIDOCAINE HYDROCHLORIDE 10 MG/ML
INJECTION, SOLUTION EPIDURAL; INFILTRATION; INTRACAUDAL; PERINEURAL AS NEEDED
Status: DISCONTINUED | OUTPATIENT
Start: 2022-11-10 | End: 2022-11-10 | Stop reason: SURG

## 2022-11-10 RX ORDER — SODIUM CHLORIDE, SODIUM LACTATE, POTASSIUM CHLORIDE, CALCIUM CHLORIDE 600; 310; 30; 20 MG/100ML; MG/100ML; MG/100ML; MG/100ML
INJECTION, SOLUTION INTRAVENOUS CONTINUOUS
Status: DISCONTINUED | OUTPATIENT
Start: 2022-11-10 | End: 2022-11-10

## 2022-11-10 RX ADMIN — SODIUM CHLORIDE, SODIUM LACTATE, POTASSIUM CHLORIDE, CALCIUM CHLORIDE: 600; 310; 30; 20 INJECTION, SOLUTION INTRAVENOUS at 12:23:00

## 2022-11-10 RX ADMIN — SODIUM CHLORIDE, SODIUM LACTATE, POTASSIUM CHLORIDE, CALCIUM CHLORIDE: 600; 310; 30; 20 INJECTION, SOLUTION INTRAVENOUS at 12:38:00

## 2022-11-10 RX ADMIN — LIDOCAINE HYDROCHLORIDE 50 MG: 10 INJECTION, SOLUTION EPIDURAL; INFILTRATION; INTRACAUDAL; PERINEURAL at 12:24:00

## 2022-11-10 RX ADMIN — SODIUM CHLORIDE, SODIUM LACTATE, POTASSIUM CHLORIDE, CALCIUM CHLORIDE: 600; 310; 30; 20 INJECTION, SOLUTION INTRAVENOUS at 12:55:00

## 2022-11-10 RX ADMIN — SODIUM CHLORIDE, SODIUM LACTATE, POTASSIUM CHLORIDE, CALCIUM CHLORIDE: 600; 310; 30; 20 INJECTION, SOLUTION INTRAVENOUS at 13:05:00

## 2022-11-10 NOTE — DISCHARGE INSTRUCTIONS
Home Care Instructions for Colonoscopywith Sedation    Diet:  - Resume your regular diet as tolerated unless otherwise instructed. - Start with light meals to minimize bloating.  - Do not drink alcohol today. Medication:  - If you have questions about resuming your normal medications, please contact your Primary Care Physician. Activities:  - Take it easy today. Do not return to work today. - Do not drive today. - Do not operate any machinery today (including kitchen equipment). Colonoscopy:  - You may notice some rectal \"spotting\" (a little blood on the toilet tissue) for a day or two after the exam. This is normal.  - If you experience any rectal bleeding (not spotting), persistent tenderness or sharp severe abdominal pains, oral temperature over 100 degrees Fahrenheit, light-headedness or dizziness, or any other problems, contact your doctor. **If unable to reach your doctor, please go to the [facility] Emergency Room**    - Your referring physician will receive a full report of your examination.  - If you do not hear from your doctor's office within two weeks of your biopsy, please call them for your results. You may be able to see your laboratory results in [facility] between 4 and 7 business days. In some cases, your physician may not have viewed the results before they are released to [address] If you have questions regarding your results contact the physician who ordered the test/exam by phone or via [address] by choosing \"Ask a Medical Question. \"

## 2022-11-10 NOTE — ANESTHESIA POSTPROCEDURE EVALUATION
Patient: Martín Chaney    Procedure Summary     Date: 11/10/22 Room / Location: [facility] ENDOSCOPY 02 / [facility] ENDO    Anesthesia Start: 5735 Anesthesia Stop: 4113    Procedure: COLONOSCOPY Diagnosis:       Screen for colon cancer      (polyps, diverticulosis)    Surgeons: Ade Valadez MD Anesthesiologist:     Anesthesia Type: MAC ASA Status: 2          Anesthesia Type: MAC    Vitals Value Taken Time   /75 11/10/22 1311   Temp  11/10/22 1316   Pulse 57 11/10/22 1316   Resp 12 11/10/22 1316   SpO2 97 % 11/10/22 1316   Vitals shown include unvalidated device data.     [facility] Post Evaluation:   Patient Evaluated in PACU  Patient Participation: complete - patient participated  Level of Consciousness: awake  Pain Management: adequate  Airway Patency:patent  Dental exam unchanged from preop  Yes    Cardiovascular Status: acceptable  Respiratory Status: acceptable  Postoperative Hydration acceptable      DANIE HERNANDEZ DO  11/10/2022 1:16 PM

## 2022-11-10 NOTE — OPERATIVE REPORT
[address] Endoscopy Report      Date of Procedure:  11/10/22      Preoperative Diagnosis:  Colorectal cancer screening      Postoperative Diagnosis:  1. Colon polyps  2. Sigmoid colon diverticulosis      Procedure:    Colonoscopy with cold biopsy removal of polyps      Surgeon:  Niraj Darnell M.D. Anesthesia:  Monitored anesthesia care  Cecal withdrawal time: 31 minutes  EBL:  Insignificant      Brief History: This is a 52year old male who presents for a screening colonoscopy. He has had no lower gastrointestinal tract symptoms, signs or family history of colorectal cancer. Technique:  After informed consent, the patient was placed in the left lateral recumbent position. Digital rectal examination revealed no palpable intraluminal abnormalities. An Olympus variable stiffness 190 series HD colonoscope was inserted into the rectum and advanced under direct vision by following the lumen to the terminal ileum. The colon was examined upon withdrawal in the left lateral recumbent position. Findings:  The preparation of the colon was very good. The terminal ileum was examined for 5 cm and visually normal.  The ileocecal valve was well preserved. The visualized colonic mucosa from the cecum to the anal verge was normal with an intact vascular pattern. There were #2 polyps seen within the colon which removed as follows:    1. In the proximal transverse colon there was a 2 mm sessile polyp which was removed with a cold biopsy forceps. 2.  In the proximal sigmoid colon there was a 4 mm sessile polyp was removed with a combination of a cold biopsy forceps and cold snare. Inspection of both sites revealed no evidence of ongoing bleeding. There was a least #1 diverticulum seen in the sigmoid colon without current signs of complication. There were no other colonic polyps, mass lesions, vascular anomalies or signs of inflammation seen.   Retroflexion in the rectum revealed no abnormalities. The procedure was well tolerated without immediate complication. Impression:  1. Colon polyps  2. Uncomplicated sigmoid colon diverticulosis    Recommendations:  1. High-fiber diet. 2.  Follow-up biopsy results. Polyp histology to determine recommendations regarding follow-up.         Ronni Galvan MD  11/10/2022

## 2022-11-13 ENCOUNTER — TELEPHONE (OUTPATIENT)
Facility: CLINIC | Age: 47
End: 2022-11-13

## 2022-11-14 NOTE — TELEPHONE ENCOUNTER
Recall colon in 10 years per Dr Luba Lawrence.  Colon done 11/10/2022    Maicol maintenance updated and message sent to pt out reach to complete colonoscopy in 10 years

## 2022-12-19 ENCOUNTER — TELEPHONE (OUTPATIENT)
Dept: RHEUMATOLOGY | Facility: CLINIC | Age: 47
End: 2022-12-19

## 2022-12-19 DIAGNOSIS — L40.9 PSORIASIS: ICD-10-CM

## 2022-12-19 DIAGNOSIS — L40.50 PSORIATIC ARTHRITIS (HCC): ICD-10-CM

## 2022-12-19 DIAGNOSIS — Z51.81 MEDICATION MONITORING ENCOUNTER: Primary | ICD-10-CM

## 2022-12-19 RX ORDER — SULFASALAZINE 500 MG/1
TABLET ORAL
Qty: 120 TABLET | Refills: 1 | Status: SHIPPED | OUTPATIENT
Start: 2022-12-19

## 2022-12-19 NOTE — TELEPHONE ENCOUNTER
LOV: 9/19/2022  Future Appointments   Date Time Provider Ivania Crarizales   3/20/2023  8:20 AM Stephen Resendiz MD [address] [facility]   11/7/2023  3:45 PM Subhash Cleaning MD [facility]     Labs:   Component      Latest Ref Rng & Units 8/24/2022   Glucose      70 - 99 mg/dL 86   Sodium      136 - 145 mmol/L 140   Potassium      3.5 - 5.1 mmol/L 4.1   Chloride      98 - 112 mmol/L 108   Carbon Dioxide, Total      21.0 - 32.0 mmol/L 27.0   ANION GAP      0 - 18 mmol/L 5   BUN      7 - 18 mg/dL 21 (H)   CREATININE      0.70 - 1.30 mg/dL 0.96   BUN/CREATININE RATIO      10.0 - 20.0 21.9 (H)   CALCIUM      8.5 - 10.1 mg/dL 9.1   CALCULATED OSMOLALITY      275 - 295 mOsm/kg 292   eGFR-Cr      >=60 mL/min/1.73m2 98   ALT (SGPT)      16 - 61 U/L 42   AST (SGOT)      15 - 37 U/L 23   ALKALINE PHOSPHATASE      45 - 117 U/L 70   Total Bilirubin      0.1 - 2.0 mg/dL 0.7   PROTEIN, TOTAL      6.4 - 8.2 g/dL 7.0   Albumin      3.4 - 5.0 g/dL 3.6   Globulin      2.8 - 4.4 g/dL 3.4   A/G Ratio      1.0 - 2.0 1.1   Patient Fasting for CMP?        Yes     Component      Latest Ref Rng & Units 9/19/2022   WBC      4.0 - 11.0 x10(3) uL 6.8   RBC      4.30 - 5.70 x10(6)uL 5.02   Hemoglobin      13.0 - 17.5 g/dL 14.6   Hematocrit      39.0 - 53.0 % 45.7   MCV      80.0 - 100.0 fL 91.0   MCH      26.0 - 34.0 pg 29.1   MCHC      31.0 - 37.0 g/dL 31.9   RDW-SD      35.1 - 46.3 fL 42.5   RDW      11.0 - 15.0 % 12.7   Platelet Count      711.2 - 450.0 10(3)uL 244.0   Prelim Neutrophil Abs      1.50 - 7.70 x10 (3) uL 4.03   Neutrophils Absolute      1.50 - 7.70 x10(3) uL 4.03   Lymphocytes Absolute      1.00 - 4.00 x10(3) uL 1.89   Monocytes Absolute      0.10 - 1.00 x10(3) uL 0.65   Eosinophils Absolute      0.00 - 0.70 x10(3) uL 0.17   Basophils Absolute      0.00 - 0.20 x10(3) uL 0.03   Immature Granulocyte Absolute      0.00 - 1.00 x10(3) uL 0.01   Neutrophils %      % 59.5   Lymphocytes %      % 27.9   Monocytes %      % 9.6 Eosinophils %      % 2.5   Basophils %      % 0.4   Immature Granulocyte %      % 0.1

## 2022-12-19 NOTE — TELEPHONE ENCOUNTER
I will refill medication. He needs to get blood work, CMP to evaluate his liver enzymes as this medication was new back in September. I will place the order if he can get it done sometime this week or next week.

## 2023-02-14 RX ORDER — ALBUTEROL SULFATE 90 UG/1
AEROSOL, METERED RESPIRATORY (INHALATION)
Qty: 3 EACH | Refills: 1 | Status: SHIPPED | OUTPATIENT
Start: 2023-02-14

## 2023-02-14 RX ORDER — LISINOPRIL 10 MG/1
10 TABLET ORAL DAILY
Qty: 90 TABLET | Refills: 1 | Status: SHIPPED | OUTPATIENT
Start: 2023-02-14

## 2023-03-10 DIAGNOSIS — L40.9 PSORIASIS: ICD-10-CM

## 2023-03-10 DIAGNOSIS — L40.50 PSORIATIC ARTHRITIS (HCC): ICD-10-CM

## 2023-03-10 RX ORDER — SULFASALAZINE 500 MG/1
1000 TABLET ORAL 2 TIMES DAILY
Qty: 360 TABLET | Refills: 1 | Status: SHIPPED | OUTPATIENT
Start: 2023-03-10

## 2023-03-10 RX ORDER — SULFASALAZINE 500 MG/1
TABLET ORAL
Qty: 120 TABLET | Refills: 1 | OUTPATIENT
Start: 2023-03-10

## 2023-03-10 NOTE — TELEPHONE ENCOUNTER
I refilled the prescription for him.   If he can also get blood work to monitor the kidney and liver on sulfasalazine

## 2023-03-20 ENCOUNTER — OFFICE VISIT (OUTPATIENT)
Dept: RHEUMATOLOGY | Facility: CLINIC | Age: 48
End: 2023-03-20

## 2023-03-20 ENCOUNTER — LAB ENCOUNTER (OUTPATIENT)
Dept: LAB | Facility: HOSPITAL | Age: 48
End: 2023-03-20
Attending: INTERNAL MEDICINE
Payer: COMMERCIAL

## 2023-03-20 ENCOUNTER — PATIENT MESSAGE (OUTPATIENT)
Dept: RHEUMATOLOGY | Facility: CLINIC | Age: 48
End: 2023-03-20

## 2023-03-20 VITALS
BODY MASS INDEX: 30.86 KG/M2 | HEART RATE: 57 BPM | HEIGHT: 73 IN | WEIGHT: 232.88 LBS | DIASTOLIC BLOOD PRESSURE: 82 MMHG | SYSTOLIC BLOOD PRESSURE: 148 MMHG

## 2023-03-20 DIAGNOSIS — Z51.81 MEDICATION MONITORING ENCOUNTER: Primary | ICD-10-CM

## 2023-03-20 DIAGNOSIS — L40.9 PSORIASIS: ICD-10-CM

## 2023-03-20 DIAGNOSIS — L40.50 PSORIATIC ARTHRITIS (HCC): ICD-10-CM

## 2023-03-20 LAB
ALBUMIN SERPL-MCNC: 3.7 G/DL (ref 3.4–5)
ALBUMIN/GLOB SERPL: 1.4 {RATIO} (ref 1–2)
ALP LIVER SERPL-CCNC: 65 U/L
ALT SERPL-CCNC: 29 U/L
ANION GAP SERPL CALC-SCNC: 2 MMOL/L (ref 0–18)
AST SERPL-CCNC: 28 U/L (ref 15–37)
BASOPHILS # BLD AUTO: 0.02 X10(3) UL (ref 0–0.2)
BASOPHILS NFR BLD AUTO: 0.4 %
BILIRUB SERPL-MCNC: 0.5 MG/DL (ref 0.1–2)
BUN BLD-MCNC: 29 MG/DL (ref 7–18)
BUN/CREAT SERPL: 26.4 (ref 10–20)
CALCIUM BLD-MCNC: 8.9 MG/DL (ref 8.5–10.1)
CHLORIDE SERPL-SCNC: 110 MMOL/L (ref 98–112)
CO2 SERPL-SCNC: 29 MMOL/L (ref 21–32)
CREAT BLD-MCNC: 1.1 MG/DL
DEPRECATED RDW RBC AUTO: 41.4 FL (ref 35.1–46.3)
EOSINOPHIL # BLD AUTO: 0.11 X10(3) UL (ref 0–0.7)
EOSINOPHIL NFR BLD AUTO: 2.2 %
ERYTHROCYTE [DISTWIDTH] IN BLOOD BY AUTOMATED COUNT: 12.4 % (ref 11–15)
FASTING STATUS PATIENT QL REPORTED: YES
GFR SERPLBLD BASED ON 1.73 SQ M-ARVRAT: 83 ML/MIN/1.73M2 (ref 60–?)
GLOBULIN PLAS-MCNC: 2.7 G/DL (ref 2.8–4.4)
GLUCOSE BLD-MCNC: 99 MG/DL (ref 70–99)
HCT VFR BLD AUTO: 46.4 %
HGB BLD-MCNC: 15.2 G/DL
IMM GRANULOCYTES # BLD AUTO: 0.01 X10(3) UL (ref 0–1)
IMM GRANULOCYTES NFR BLD: 0.2 %
LYMPHOCYTES # BLD AUTO: 1.68 X10(3) UL (ref 1–4)
LYMPHOCYTES NFR BLD AUTO: 33.4 %
MCH RBC QN AUTO: 29.7 PG (ref 26–34)
MCHC RBC AUTO-ENTMCNC: 32.8 G/DL (ref 31–37)
MCV RBC AUTO: 90.6 FL
MONOCYTES # BLD AUTO: 0.65 X10(3) UL (ref 0.1–1)
MONOCYTES NFR BLD AUTO: 12.9 %
NEUTROPHILS # BLD AUTO: 2.56 X10 (3) UL (ref 1.5–7.7)
NEUTROPHILS # BLD AUTO: 2.56 X10(3) UL (ref 1.5–7.7)
NEUTROPHILS NFR BLD AUTO: 50.9 %
OSMOLALITY SERPL CALC.SUM OF ELEC: 298 MOSM/KG (ref 275–295)
PLATELET # BLD AUTO: 226 10(3)UL (ref 150–450)
POTASSIUM SERPL-SCNC: 4.2 MMOL/L (ref 3.5–5.1)
PROT SERPL-MCNC: 6.4 G/DL (ref 6.4–8.2)
RBC # BLD AUTO: 5.12 X10(6)UL
SODIUM SERPL-SCNC: 141 MMOL/L (ref 136–145)
WBC # BLD AUTO: 5 X10(3) UL (ref 4–11)

## 2023-03-20 PROCEDURE — 36415 COLL VENOUS BLD VENIPUNCTURE: CPT | Performed by: INTERNAL MEDICINE

## 2023-03-20 PROCEDURE — 3008F BODY MASS INDEX DOCD: CPT | Performed by: INTERNAL MEDICINE

## 2023-03-20 PROCEDURE — 3079F DIAST BP 80-89 MM HG: CPT | Performed by: INTERNAL MEDICINE

## 2023-03-20 PROCEDURE — 85025 COMPLETE CBC W/AUTO DIFF WBC: CPT | Performed by: INTERNAL MEDICINE

## 2023-03-20 PROCEDURE — 3077F SYST BP >= 140 MM HG: CPT | Performed by: INTERNAL MEDICINE

## 2023-03-20 PROCEDURE — 80053 COMPREHEN METABOLIC PANEL: CPT | Performed by: INTERNAL MEDICINE

## 2023-03-20 PROCEDURE — 99214 OFFICE O/P EST MOD 30 MIN: CPT | Performed by: INTERNAL MEDICINE

## 2023-03-20 NOTE — TELEPHONE ENCOUNTER
From: Yen Domínguez [place]  To: Tony Sesay MD  Sent: 3/20/2023 10:27 AM CDT  Subject: Blood work request    Son Pelletier,    Two questions,    1. Should I be concerned about the bun and bun/creatinine ratios? 2. I received a text to schedule blood work. Do you need me to do more blood work?     Thank you,  Florence Christopher

## 2023-03-20 NOTE — PATIENT INSTRUCTIONS
You are seen for psoriasis and psoriatic arthritis  Continue sulfasalazine for now  Blood work today  When you switch insurances send us a message on MyChart we can switch to [employer]  Can follow-up in the next 6 to 9 months

## 2023-03-20 NOTE — TELEPHONE ENCOUNTER
MA roomed patient, obtained vitals, reviewed medication and allergy lists with patient.    Visit Vitals  /68 ([place] [facility] [place]: Sitting, Cuff Size: Regular)   Pulse 84   Temp 97.4 °F (36.3 °C) (Oral)   Ht 5' 10\" (1.778 m)   Wt 118.6 kg   SpO2 93%   BMI 37.52 kg/m²       Galina Durán CMA  3/26/2018       Please see message below and response to pt.

## 2023-06-17 ENCOUNTER — PATIENT MESSAGE (OUTPATIENT)
Dept: RHEUMATOLOGY | Facility: CLINIC | Age: 48
End: 2023-06-17

## 2023-06-19 NOTE — TELEPHONE ENCOUNTER
From: Alfred Parks Brashear  To: Dulce Russell MD  Sent: 6/17/2023 4:20 PM CDT  Subject: Lara Kc you are enjoying the weekend. I am finally on my new insurance and want to start back on [place]. I have attached my insurance cards. Let me know what I need to do to get started.     Thank you,  Lucio Holder

## 2023-07-03 ENCOUNTER — OFFICE VISIT (OUTPATIENT)
Dept: RHEUMATOLOGY | Facility: CLINIC | Age: 48
End: 2023-07-03

## 2023-07-03 ENCOUNTER — TELEPHONE (OUTPATIENT)
Dept: RHEUMATOLOGY | Facility: CLINIC | Age: 48
End: 2023-07-03

## 2023-07-03 VITALS
WEIGHT: 224 LBS | SYSTOLIC BLOOD PRESSURE: 137 MMHG | HEIGHT: 73 IN | BODY MASS INDEX: 29.69 KG/M2 | HEART RATE: 62 BPM | DIASTOLIC BLOOD PRESSURE: 79 MMHG

## 2023-07-03 DIAGNOSIS — L40.9 PSORIASIS: ICD-10-CM

## 2023-07-03 DIAGNOSIS — L40.50 PSORIATIC ARTHRITIS (HCC): Primary | ICD-10-CM

## 2023-07-03 DIAGNOSIS — Z51.81 ENCOUNTER FOR THERAPEUTIC DRUG MONITORING: ICD-10-CM

## 2023-07-03 DIAGNOSIS — L40.50 PSORIATIC ARTHRITIS (HCC): ICD-10-CM

## 2023-07-03 DIAGNOSIS — Z51.81 MEDICATION MONITORING ENCOUNTER: Primary | ICD-10-CM

## 2023-07-03 NOTE — TELEPHONE ENCOUNTER
If we can get patient approved for Joon Elizabeth, he was on it 1 year ago but insurance denied it, now has new insurance

## 2023-07-05 NOTE — TELEPHONE ENCOUNTER
PA start    Prior authorization for: [employer]    Medication form: 30 mg tablet    Submission method: Surescrimacho    Spoke with (if by phone):     Date submitted: 7/5/23    Tracking #:    QF-TB result:   Component      Latest Ref Rng 10/6/2018   Quantiferon TB NIL      IU/mL 0.063    QUANTIFERON TB MINUS NIL      IU/mL 0.012    Quantiferon TB Mitogen minus NIL      IU/mL 8.199    Quantiferon TB Result      Negative  Negative

## 2023-07-06 RX ORDER — APREMILAST 30 MG/1
30 TABLET, FILM COATED ORAL 2 TIMES DAILY
Qty: 60 TABLET | Refills: 5 | Status: SHIPPED | OUTPATIENT
Start: 2023-07-06

## 2023-07-06 NOTE — TELEPHONE ENCOUNTER
Left message regarding approval of Michelle Raya.  Script sent to pharmacy and follow up labs ordered for [facility].

## 2023-07-06 NOTE — TELEPHONE ENCOUNTER
Script pended. Last QFT completed was 10/6/2018, okay to order?        PA Approved    Prior authorization for: [employer]     Medication form: 30 mg     Date received: 7/5/23    Approval #: [id number]    Approved dates: 7/5/23- 7/5/24    Pharmacy for medication: [facility]    QF-TB results:

## 2023-07-12 ENCOUNTER — PATIENT MESSAGE (OUTPATIENT)
Dept: RHEUMATOLOGY | Facility: CLINIC | Age: 48
End: 2023-07-12

## 2023-07-12 DIAGNOSIS — L40.50 PSORIATIC ARTHRITIS (HCC): Primary | ICD-10-CM

## 2023-07-13 RX ORDER — APREMILAST 30 MG/1
30 TABLET, FILM COATED ORAL 2 TIMES DAILY
Qty: 60 TABLET | Refills: 5 | Status: SHIPPED | OUTPATIENT
Start: 2023-07-13

## 2023-07-13 NOTE — TELEPHONE ENCOUNTER
Apremilast (OTEZLA) 30 MG Oral Tab 60 tablet 5 7/6/2023     Sig - Route: Take 30 mg by mouth 2 (two) times daily. - Oral    Sent to pharmacy as: [place] Corpus 30 MG Oral Tablet (Apremilast)    Notes to Pharmacy: No titration maryse, Dx:L40.50 and L40.9    E-Prescribing Status: Receipt confirmed by pharmacy (7/6/2023  8:42 AM CDT)    No prior authorization was found for this prescription. Found prior authorization for another prescription for the same medication: Canceled - Other (Error message  )      Pharmacy    [place] [address], [address] [place] [address] [phone], [phone]     Dr. Sydni Watson approve medication refill on 7/6/23. Refill resent to [facility]. Risa Bartlett, RN     [facility]    7/6/23  6:58 AM  Note  Script pended. Last QFT completed was 10/6/2018, okay to order?          PA Approved     Prior authorization for: Clayburn Corpus      Medication form: 30 mg      Date received: 7/5/23     Approval #: [id number]     Approved dates: 7/5/23- 7/5/24     Pharmacy for medication: [facility]     QF-TB results:

## 2023-07-13 NOTE — TELEPHONE ENCOUNTER
From: James Sterling Lamona  To: Davide Morrell MD  Sent: 7/12/2023 10:56 PM CDT  Subject: [place] prior authorization     Konstantin Kahn,    Thank you for getting the [facility] prescription approved however I have received an email from [facility] saying they need a prior authorization to fulfill this prescription. Have you received this request? If not, what can I do to move the prescription forward?     Thank you,  Vinod Armenta

## 2023-07-15 ENCOUNTER — PATIENT MESSAGE (OUTPATIENT)
Dept: FAMILY MEDICINE CLINIC | Facility: CLINIC | Age: 48
End: 2023-07-15

## 2023-07-17 RX ORDER — LISINOPRIL 10 MG/1
10 TABLET ORAL DAILY
Qty: 90 TABLET | Refills: 1 | OUTPATIENT
Start: 2023-07-17

## 2023-07-17 RX ORDER — LISINOPRIL 10 MG/1
10 TABLET ORAL DAILY
Qty: 90 TABLET | Refills: 3 | Status: SHIPPED | OUTPATIENT
Start: 2023-07-17

## 2023-07-17 NOTE — TELEPHONE ENCOUNTER
From: Miller Perez  To: Jay Shea MD  Sent: 7/15/2023 2:16 PM CDT  Subject: Medication refill    Hey Dr Esmer Yao you are having a great weekend. I submitted a refill request for the linospril since the mail order pharmacy lost the last refill during delivery. I have been without it for 3 weeks (maybe longer) while waiting for them to find it. In that time, I have new insurance, can you have the request refilled at the [facility]?     Thank you,  Dorothea Mazariegos

## 2023-08-28 ENCOUNTER — LAB ENCOUNTER (OUTPATIENT)
Dept: LAB | Age: 48
End: 2023-08-28
Attending: FAMILY MEDICINE
Payer: COMMERCIAL

## 2023-08-28 ENCOUNTER — OFFICE VISIT (OUTPATIENT)
Dept: FAMILY MEDICINE CLINIC | Facility: CLINIC | Age: 48
End: 2023-08-28

## 2023-08-28 VITALS
WEIGHT: 227 LBS | OXYGEN SATURATION: 98 % | BODY MASS INDEX: 30.09 KG/M2 | SYSTOLIC BLOOD PRESSURE: 134 MMHG | DIASTOLIC BLOOD PRESSURE: 72 MMHG | RESPIRATION RATE: 18 BRPM | HEART RATE: 70 BPM | HEIGHT: 73 IN

## 2023-08-28 DIAGNOSIS — I42.2 HYPERTROPHIC CARDIOMYOPATHY (HCC): ICD-10-CM

## 2023-08-28 DIAGNOSIS — I10 PRIMARY HYPERTENSION: ICD-10-CM

## 2023-08-28 DIAGNOSIS — Z00.00 ANNUAL PHYSICAL EXAM: Primary | ICD-10-CM

## 2023-08-28 DIAGNOSIS — L40.50 PSORIATIC ARTHRITIS (HCC): ICD-10-CM

## 2023-08-28 PROCEDURE — 3075F SYST BP GE 130 - 139MM HG: CPT | Performed by: FAMILY MEDICINE

## 2023-08-28 PROCEDURE — 3078F DIAST BP <80 MM HG: CPT | Performed by: FAMILY MEDICINE

## 2023-08-28 PROCEDURE — 99396 PREV VISIT EST AGE 40-64: CPT | Performed by: FAMILY MEDICINE

## 2023-08-28 PROCEDURE — 3008F BODY MASS INDEX DOCD: CPT | Performed by: FAMILY MEDICINE

## 2023-08-28 NOTE — PROGRESS NOTES
HPI:    Krystina Ocampo is a 50year old male presents to clinic for annual physical exam.  Pepper Villar blood pressure has been elevated. Has been out of town, not exercising as much. Also eating restaurant food. Reports some lower extremity edema when he travels. Normal appetite. Normal bowel movements and urination. Normal sleep habits. No change in family medical history. HISTORY:  Past Medical History:   Diagnosis Date    Bradycardia     Deviated nasal septum     High blood pressure     Hypertrophy of nasal turbinates     Hypertrophy of tonsil     Tendonitis of knee, left       Past Surgical History:   Procedure Laterality Date    COLONOSCOPY N/A 11/10/2022    Procedure: COLONOSCOPY;  Surgeon: Tuyet Ayoub MD;  Location: [facility] END[facility]    EP LOOP RECORDER IMPLANT      nov 2017    EXCISION TURBINATE,SUBMUCOUS      REMOVAL OF TONSILS,12+ Y/O      REPAIR OF NASAL SEPTUM      TONSILLECTOMY      Uvulectomy. Family History   Problem Relation Age of Onset    Diabetes Father     Diabetes Paternal Grandfather       Social History:   Social History     Socioeconomic History    Marital status:     Number of children: 3   Occupational History    Occupation: Financial   Tobacco Use    Smoking status: Never    Smokeless tobacco: Never   Vaping Use    Vaping Use: Never used   Substance and Sexual Activity    Alcohol use: Yes     Alcohol/week: 0.0 standard drinks of alcohol     Comment: occasional    Drug use: No        Medications (Active prior to today's visit):  Current Outpatient Medications   Medication Sig Dispense Refill    lisinopril 10 MG Oral Tab Take 1 tablet (10 mg total) by mouth daily. 90 tablet 3    Apremilast (OTEZLA) 30 MG Oral Tab Take 30 mg by mouth 2 (two) times daily.  60 tablet 5    albuterol 108 (90 Base) MCG/ACT Inhalation Aero Soln inhale 2 puff by inhalation route  every 4 - 6 hours as needed 3 each 1    MELOXICAM 15 MG Oral Tab TAKE 1 TABLET DAILY 90 tablet 3 Cholecalciferol (VITAMIN D) 2000 units Oral Cap Take by mouth daily. aspirin 81 MG Oral Tab Take 1 tablet (81 mg total) by mouth daily. Multiple Vitamin (MULTI VITAMIN MENS) Oral Tab Take 1 tablet by mouth daily. sulfaSALAzine 500 MG Oral Tab Take 2 tablets (1,000 mg total) by mouth 2 (two) times daily. (Patient not taking: Reported on 8/28/2023) 360 tablet 1    albuterol 108 (90 Base) MCG/ACT Inhalation Aero Soln inhale 2 puff by inhalation route  every 4 - 6 hours as needed 1 each 5    fluticasone furoate-vilanterol (BREO ELLIPTA) 100-25 MCG/INH Inhalation Aerosol Powder, Breath Activated Inhale 1 puff into the lungs daily. (Patient not taking: Reported on 8/28/2023) 60 each 3       Allergies:  No Known Allergies      Depression Screening (PHQ-2/PHQ-9): Over the LAST 2 WEEKS   Little interest or pleasure in doing things: Not at all    Feeling down, depressed, or hopeless: Not at all    PHQ-2 SCORE: 0           ROS:   Review of Systems   All other systems reviewed and are negative. PHYSICAL EXAM:      08/28/23  0855 08/28/23  0930   BP: 149/78 134/72   BP Location: Right arm    Patient Position: Sitting    Cuff Size: large    Pulse: 70    Resp: 18    SpO2: 98%    Weight: 227 lb (103 kg)    Height: 6' 1\" (1.854 m)      Physical Exam  Vitals reviewed. Constitutional:       General: He is not in acute distress. HENT:      Head: Normocephalic and atraumatic. Right Ear: Tympanic membrane, ear canal and external ear normal.      Left Ear: Tympanic membrane, ear canal and external ear normal.      Nose: Nose normal.      Mouth/Throat:      Pharynx: Uvula midline. Eyes:      Conjunctiva/sclera: Conjunctivae normal.      Pupils: Pupils are equal, round, and reactive to light. Neck:      Thyroid: No thyromegaly. Cardiovascular:      Rate and Rhythm: Normal rate and regular rhythm. Heart sounds: Normal heart sounds. No murmur heard.   Pulmonary:      Effort: Pulmonary effort is normal. No respiratory distress. Breath sounds: Normal breath sounds. No wheezing or rales. Abdominal:      General: Bowel sounds are normal. There is no distension. Palpations: Abdomen is soft. Tenderness: There is no abdominal tenderness. There is no guarding or rebound. Musculoskeletal:      Cervical back: Normal range of motion and neck supple. Lymphadenopathy:      Cervical: No cervical adenopathy. Neurological:      Mental Status: He is alert. ASSESSMENT/PLAN:   (Z00.00) Annual physical exam  (primary encounter diagnosis)  Plan: LIPID PANEL, ASSAY, THYROID STIM HORMONE  - Prevnar 20 deferred,   - Reinforced healthy diet, lifestyle, and exercise. - Past Medical/Social/Family histories reviewed  - Regular dental visits recommended   - Regular eye exams recommended   - Colorectal Cancer Screening due on 11/10/2032      Follow up in 1 year or sooner if needed      (I10) Primary hypertension  Plan:   - BP initially elevated, improved on second reading. Low-salt diet, regular physical activity encouraged. Has follow-up appointment on Wednesday with cardiology    (L40.50) Psoriatic arthritis ([facility]  Plan:   -Follows with rheumatology. On O[place]. Lab work drawn today    (I42.2) Hypertrophic cardiomyopathy ([facility]  Plan  -Has follow-up appointment on Wednesday with cardiology. Will continue to follow     Responsible party/patient verbalized understanding of information discussed. No barriers to learning observed. Orders This Visit:  Orders Placed This Encounter      LIPID PANEL [7600] [Q]      TSH [899] [Q]      Prevnar 20 (PCV20) [27558]      Meds This Visit:  Requested Prescriptions      No prescriptions requested or ordered in this encounter       Imaging & Referrals:  PCV20 VACCINE FOR INTRAMUSCULAR USE       The 21st Century cures Act makes medical notes like these available to patients in the interest of transparency. However, be advised that this is a medical document.   It is intended as peer to peer communication. It is written in medical language and may contain abbreviations or verbiage that are unfamiliar. It may appear blunt or direct. Medical documents are intended to carry relevant information, facts as evident, and the clinical opinion of the practitioner. This note was created by [employer] voice recognition. Errors in content may be related to improper recognition by the system; efforts to review and correct have been done but errors may still exist. Please contact me with any questions.        8/28/2023  Dionna Ho MD

## 2023-08-29 LAB
CHOL/HDLC RATIO: 3.4 (CALC)
CHOLESTEROL, TOTAL: 171 MG/DL
HDL CHOLESTEROL: 50 MG/DL
LDL-CHOLESTEROL: 108 MG/DL (CALC)
NON-HDL CHOLESTEROL: 121 MG/DL (CALC)
TRIGLYCERIDES: 45 MG/DL
TSH: 1.32 MIU/L (ref 0.4–4.5)

## 2023-08-30 LAB
ABSOLUTE BASOPHILS: 23 CELLS/UL (ref 0–200)
ABSOLUTE EOSINOPHILS: 114 CELLS/UL (ref 15–500)
ABSOLUTE LYMPHOCYTES: 2295 CELLS/UL (ref 850–3900)
ABSOLUTE MONOCYTES: 593 CELLS/UL (ref 200–950)
ABSOLUTE NEUTROPHILS: 4575 CELLS/UL (ref 1500–7800)
ALBUMIN: 4.2 G/DL (ref 3.6–5.1)
ALT: 59 U/L (ref 9–46)
AST: 36 U/L (ref 10–40)
BASOPHILS: 0.3 %
C-REACTIVE PROTEIN: 2.5 MG/L
CREATININE: 0.99 MG/DL (ref 0.6–1.29)
EGFR: 94 ML/MIN/1.73M2
EOSINOPHILS: 1.5 %
HEMATOCRIT: 43.7 % (ref 38.5–50)
HEMOGLOBIN: 14.9 G/DL (ref 13.2–17.1)
LYMPHOCYTES: 30.2 %
MCH: 31.4 PG (ref 27–33)
MCHC: 34.1 G/DL (ref 32–36)
MCV: 92 FL (ref 80–100)
MITOGEN-NIL: 8.04 IU/ML
MONOCYTES: 7.8 %
MPV: 11.4 FL (ref 7.5–12.5)
NEUTROPHILS: 60.2 %
NIL: 0.01 IU/ML
PLATELET COUNT: 207 THOUSAND/UL (ref 140–400)
QUANTIFERON(R)-TB GOLD PLUS, 1 TUBE: NEGATIVE
RDW: 12.4 % (ref 11–15)
RED BLOOD CELL COUNT: 4.75 MILLION/UL (ref 4.2–5.8)
SED RATE BY MODIFIED$WESTERGREN: 2 MM/H
TB1-NIL: 0.02 IU/ML
TB2-NIL: 0.04 IU/ML
WHITE BLOOD CELL COUNT: 7.6 THOUSAND/UL (ref 3.8–10.8)

## 2023-11-17 NOTE — TELEPHONE ENCOUNTER
From: Jaspreet Deng  To: Adelita Liriano MD  Sent: 6/29/2018 1:31 PM CDT  Subject: Test Results Question    Hello Dr. Verito Wright you had a great week and looking forward to a relaxing weekend.  I took a look at the test results that have come in a MICU

## 2023-11-21 RX ORDER — MELOXICAM 15 MG/1
15 TABLET ORAL DAILY
Qty: 90 TABLET | Refills: 3 | Status: SHIPPED | OUTPATIENT
Start: 2023-11-21

## 2023-11-21 NOTE — TELEPHONE ENCOUNTER
Requested Prescriptions     Pending Prescriptions Disp Refills    Meloxicam 15 MG Oral Tab 90 tablet 3     Sig: Take 1 tablet (15 mg total) by mouth daily. Lf: 10/31/22 #90 tab w/ 3 rf  LOV: 7/3/23  No future appointments.   Labs:   Component      Latest Ref Rng 8/28/2023   WBC      3.8 - 10.8 Thousand/uL 7.6    RBC      4.20 - 5.80 Million/uL 4.75    Hemoglobin      13.2 - 17.1 g/dL 14.9    Hematocrit      38.5 - 50.0 % 43.7    MCV      80.0 - 100.0 fL 92.0    MCH      27.0 - 33.0 pg 31.4    MCHC      32.0 - 36.0 g/dL 34.1    RDW      11.0 - 15.0 % 12.4    Platelet Count      620 - 400 Thousand/uL 207    MPV      7.5 - 12.5 fL 11.4    Neutrophils Absolute      1,500 - 7,800 cells/uL 4,575    Lymphocytes Absolute      850 - 3,900 cells/uL 2,295    Monocytes Absolute      200 - 950 cells/uL 593    Eosinophils Absolute      15 - 500 cells/uL 114    Basophils Absolute      0 - 200 cells/uL 23    Neutrophils %      % 60.2    Lymphocytes %      % 30.2    Monocytes %      % 7.8    Eosinophils %      % 1.5    Basophils %      % 0.3    Cholesterol, Total      <200 mg/dL 171    HDL Cholesterol      > OR = 40 mg/dL 50    Triglycerides      <150 mg/dL 45    LDL Cholesterol Calc      mg/dL (calc) 108 (H)    Chol/HDL Ratio      <5.0 (calc) 3.4    NON-HDL CHOLESTEROL      <130 mg/dL (calc) 121    QUANTIFERON(R)-TB GOLD PLUS, 1 TUBE      NEGATIVE  NEGATIVE    NIL      IU/mL 0.01    MITOGEN-NIL      IU/mL 8.04    TB1-NIL      IU/mL 0.02    TB2-NIL      IU/mL 0.04    CREATININE      0.60 - 1.29 mg/dL 0.99    EGFR      > OR = 60 mL/min/1.73m2 94    Albumin      3.6 - 5.1 g/dL 4.2    ALT (SGPT)      9 - 46 U/L 59 (H)    AST (SGOT)      10 - 40 U/L 36    C-REACTIVE PROTEIN      <8.0 mg/L 2.5    SED RATE BY MODIFIED$WESTERGREN      < OR = 15 mm/h 2    TSH      0.40 - 4.50 mIU/L 1.32       Legend:  (H) High    ASSESSMENT/PLAN:         Psoriasis and psoriatic arthritis (polyarthritis and tendinitis)  - Has been having issues getting Eric Hirmindy approved due to insurance  - stopped SSZ as was still having some joint pain and was not helping with the psoriasis on his foot   - would like to go back on [place] will be done   - continue meloxicam daily  - Blood work reviewed from March and normal      R plantar fascitis- improved     Hypotrophic Cardiomyopathy  - Patient is following with cardiology, per pt they may send him to [facility] clinic for further w/u  - Currently asymptomatic     Follow up yearly      Dulce Russell MD  7/3/2023  8:40 AM

## 2024-01-02 DIAGNOSIS — L40.50 PSORIATIC ARTHRITIS (HCC): ICD-10-CM

## 2024-01-02 RX ORDER — APREMILAST 30 MG/1
30 TABLET, FILM COATED ORAL 2 TIMES DAILY
Qty: 60 TABLET | Refills: 1 | Status: SHIPPED | OUTPATIENT
Start: 2024-01-02

## 2024-01-02 NOTE — TELEPHONE ENCOUNTER
LOV:  7/3/2023  No future appointments.  Labs:    Component      Latest Ref Rng 8/28/2023   WBC      3.8 - 10.8 Thousand/uL 7.6    RBC      4.20 - 5.80 Million/uL 4.75    Hemoglobin      13.2 - 17.1 g/dL 14.9    Hematocrit      38.5 - 50.0 % 43.7    MCV      80.0 - 100.0 fL 92.0    MCH      27.0 - 33.0 pg 31.4    MCHC      32.0 - 36.0 g/dL 34.1    RDW      11.0 - 15.0 % 12.4    Platelet Count      140 - 400 Thousand/uL 207    MPV      7.5 - 12.5 fL 11.4    Neutrophils Absolute      1,500 - 7,800 cells/uL 4,575    Lymphocytes Absolute      850 - 3,900 cells/uL 2,295    Monocytes Absolute      200 - 950 cells/uL 593    Eosinophils Absolute      15 - 500 cells/uL 114    Basophils Absolute      0 - 200 cells/uL 23    Neutrophils %      % 60.2    Lymphocytes %      % 30.2    Monocytes %      % 7.8    Eosinophils %      % 1.5    Basophils %      % 0.3    QUANTIFERON(R)-TB GOLD PLUS, 1 TUBE      NEGATIVE  NEGATIVE    NIL      IU/mL 0.01    MITOGEN-NIL      IU/mL 8.04    TB1-NIL      IU/mL 0.02    TB2-NIL      IU/mL 0.04    CREATININE      0.60 - 1.29 mg/dL 0.99    EGFR      > OR = 60 mL/min/1.73m2 94    Albumin      3.6 - 5.1 g/dL 4.2    ALT (SGPT)      9 - 46 U/L 59 (H)    AST (SGOT)      10 - 40 U/L 36    C-REACTIVE PROTEIN      <8.0 mg/L 2.5    SED RATE BY MODIFIED$WESTERGREN      < OR = 15 mm/h 2       Legend:  (H) High

## 2024-01-02 NOTE — TELEPHONE ENCOUNTER
Current Outpatient Medications   Medication Sig Dispense Refill    Apremilast (OTEZLA) 30 MG Oral Tab Take 30 mg by mouth 2 (two) times daily. 60 tablet 5

## 2024-01-03 ENCOUNTER — HOSPITAL ENCOUNTER (OUTPATIENT)
Age: 49
Discharge: HOME | End: 2024-01-03
Payer: COMMERCIAL

## 2024-01-03 VITALS
RESPIRATION RATE: 20 BRPM | SYSTOLIC BLOOD PRESSURE: 152 MMHG | DIASTOLIC BLOOD PRESSURE: 83 MMHG | TEMPERATURE: 99 F | HEART RATE: 70 BPM | OXYGEN SATURATION: 98 %

## 2024-01-03 DIAGNOSIS — J45.21 MILD INTERMITTENT ASTHMA WITH EXACERBATION: ICD-10-CM

## 2024-01-03 DIAGNOSIS — Z20.822 ENCOUNTER FOR LABORATORY TESTING FOR COVID-19 VIRUS: ICD-10-CM

## 2024-01-03 DIAGNOSIS — J20.9 ACUTE BRONCHITIS, UNSPECIFIED ORGANISM: Primary | ICD-10-CM

## 2024-01-03 LAB — SARS-COV-2 RNA RESP QL NAA+PROBE: NOT DETECTED

## 2024-01-03 PROCEDURE — 99213 OFFICE O/P EST LOW 20 MIN: CPT | Performed by: NURSE PRACTITIONER

## 2024-01-03 PROCEDURE — U0002 COVID-19 LAB TEST NON-CDC: HCPCS | Performed by: NURSE PRACTITIONER

## 2024-01-03 RX ORDER — PREDNISONE 20 MG/1
40 TABLET ORAL DAILY
Qty: 10 TABLET | Refills: 0 | Status: SHIPPED | OUTPATIENT
Start: 2024-01-03 | End: 2024-01-08

## 2024-01-03 RX ORDER — BENZONATATE 200 MG/1
200 CAPSULE ORAL 3 TIMES DAILY PRN
Qty: 15 CAPSULE | Refills: 0 | Status: SHIPPED | OUTPATIENT
Start: 2024-01-03

## 2024-01-03 NOTE — ED PROVIDER NOTES
Patient Seen in: [facility]      History     Chief Complaint   Patient presents with    Cough/URI     Stated Complaint: Cough    Subjective:   HPI  Patient is an 48-year-old male that presents to the immediate care center today with concern for cough and  congestion that started a week ago. Pt denies known illness exposure.  He has asthma and has been using his inhaler more frequently than normal.  Pt. has been eating and drinking without difficulty; no headache or dizziness.            Objective:   Past Medical History:   Diagnosis Date    Bradycardia     Deviated nasal septum     High blood pressure     Hypercholesteremia 04/19/2016    Hypertrophy of nasal turbinates     Hypertrophy of tonsil     Tendonitis of knee, left               Past Surgical History:   Procedure Laterality Date    COLONOSCOPY N/A 11/10/2022    Procedure: COLONOSCOPY;  Surgeon: Edison Jamil MD;  Location: [facility] ENDO    EP LOOP RECORDER IMPLANT      nov 2017    EXCISION TURBINATE,SUBMUCOUS      REMOVAL OF TONSILS,12+ Y/O      REPAIR OF NASAL SEPTUM      TONSILLECTOMY      Uvulectomy.                 No pertinent social history.            Review of Systems   Constitutional:  Negative for appetite change, chills and fever.   HENT:  Positive for congestion. Negative for sinus pressure.    Respiratory:  Positive for cough.    Cardiovascular:  Negative for chest pain.   Gastrointestinal:  Negative for abdominal pain.   Musculoskeletal:  Negative for arthralgias and myalgias.   Skin:  Negative for rash.   Neurological:  Negative for dizziness, weakness and headaches.       Positive for stated complaint: Cough  Other systems are as noted in HPI.  Constitutional and vital signs reviewed.      All other systems reviewed and negative except as noted above.    Physical Exam     ED Triage Vitals [01/03/24 1137]   /83   Pulse 70   Resp 20   Temp 98.8 °F (37.1 °C)   Temp src Temporal   SpO2 98 %   O2 Device None (Room air)        Current:/83   Pulse 70   Temp 98.8 °F (37.1 °C) (Temporal)   Resp 20   SpO2 98%         Physical Exam  Vitals and nursing note reviewed.   Constitutional:       General: He is not in acute distress.     Appearance: He is not ill-appearing.   HENT:      Right Ear: Tympanic membrane and ear canal normal.      Left Ear: Tympanic membrane and ear canal normal.      Nose: Nose normal.   Eyes:      Conjunctiva/sclera: Conjunctivae normal.   Pulmonary:      Effort: Pulmonary effort is normal. No respiratory distress.      Breath sounds: Normal breath sounds.   Musculoskeletal:      Cervical back: Normal range of motion and neck supple.   Skin:     General: Skin is warm and dry.      Findings: No rash.   Neurological:      Mental Status: He is alert and oriented to person, place, and time.               ED Course     Labs Reviewed   RAPID SARS-COV-2 BY PCR - Normal                      MDM                                         Medical Decision Making  Differential diagnoses considered included, but are not exclusive of: bacterial vs viral sinusitis, dehydration, pneumonia, influenza, Covid-19 infection, and other viral upper respiratory infection.       Problems Addressed:  Acute bronchitis, unspecified organism: self-limited or minor problem    Amount and/or Complexity of Data Reviewed  Labs:  Decision-making details documented in ED Course.    Risk  OTC drugs.  Prescription drug management.        Disposition and Plan     Clinical Impression:  1. Acute bronchitis, unspecified organism    2. Encounter for laboratory testing for COVID-19 virus    3. Mild intermittent asthma with exacerbation         Disposition:  Discharge  1/3/2024 12:25 pm    Follow-up:  Kev Diaz MD  [address] [place] [id number]  [phone]    Schedule an appointment as soon as possible for a visit in 1 week  As needed          Medications Prescribed:  Current Discharge Medication List        START taking these  medications    Details   predniSONE 20 MG Oral Tab Take 2 tablets (40 mg total) by mouth daily for 5 days.  Qty: 10 tablet, Refills: 0      benzonatate 200 MG Oral Cap Take 1 capsule (200 mg total) by mouth 3 (three) times daily as needed for cough.  Qty: 15 capsule, Refills: 0

## 2024-01-22 ENCOUNTER — PATIENT MESSAGE (OUTPATIENT)
Dept: RHEUMATOLOGY | Facility: CLINIC | Age: 49
End: 2024-01-22

## 2024-01-23 ENCOUNTER — TELEPHONE (OUTPATIENT)
Dept: RHEUMATOLOGY | Facility: CLINIC | Age: 49
End: 2024-01-23

## 2024-01-23 NOTE — TELEPHONE ENCOUNTER
PA start - New Insurance    Prior authorization for: Otezla    Medication form: 30mg tablet    Submission method: SureScripts    Spoke with (if by phone):     Date submitted: 1/23/24    Tracking #:    QF-TB result:       Component      Latest Ref Rng 8/28/2023   QUANTIFERON(R)-TB GOLD PLUS, 1 TUBE      NEGATIVE  NEGATIVE    NIL      IU/mL 0.01    MITOGEN-NIL      IU/mL 8.04    TB1-NIL      IU/mL 0.02    TB2-NIL      IU/mL 0.04

## 2024-01-23 NOTE — TELEPHONE ENCOUNTER
Per My Chart Message:  I switched back to my wife’s insurance at the start of the year as it offers better benefits. I’m at the point I need to refill my Otezla prescription. We are now on [facility] which should be updated on my profile.

## 2024-01-23 NOTE — TELEPHONE ENCOUNTER
From: Jad Perez  To: Gale Mcgarry  Sent: 1/22/2024 8:57 PM CST  Subject: Otezla    Hey Dr Mcgarry,    Hope you are having a great start to 2024.    I switched back to my wife’s insurance at the start of the year as it offers better benefits. I’m at the point I need to refill my Otezla prescription. We are now on [facility] which should be updated on my profile.    Let me know what information you need from me to get all of the pre-authorization moving forward.    Thank you,  Jad

## 2024-01-24 RX ORDER — APREMILAST 30 MG/1
30 TABLET, FILM COATED ORAL 2 TIMES DAILY
Qty: 60 TABLET | Refills: 2 | Status: SHIPPED | OUTPATIENT
Start: 2024-01-24

## 2024-01-24 NOTE — TELEPHONE ENCOUNTER
PA Approved under new insurance with new specialty pharmacy. Order pended; routed for provider review and signature.     Prior authorization for: Otezla    Medication form: 30mg tablet    Date received: 1/24/24    Approval #: [id number].    Approved dates: Approved through 1/23/2025    Pharmacy for medication: [facility]    QF-TB results:     Component      Latest Ref Rng 8/28/2023   QUANTIFERON(R)-TB GOLD PLUS, 1 TUBE      NEGATIVE  NEGATIVE    NIL      IU/mL 0.01    MITOGEN-NIL      IU/mL 8.04    TB1-NIL      IU/mL 0.02    TB2-NIL      IU/mL 0.04

## 2024-02-06 ENCOUNTER — PATIENT MESSAGE (OUTPATIENT)
Dept: FAMILY MEDICINE CLINIC | Facility: CLINIC | Age: 49
End: 2024-02-06

## 2024-02-06 NOTE — TELEPHONE ENCOUNTER
From: Jad Perez  To: Kev Diaz  Sent: 2/6/2024 10:28 AM CST  Subject: Blood pressure readings    Hey Dr. Diaz,    Hope you are having a great start to 2024. Over the last few months I've noticed my blood pressure readings in the mornings have been trending up. I usually would be around 135-140 / 70-75 and now I'm closer to 145-150 / 75-80. Some days it'll be 150+ / 85-90. Not really sure why.    Let me know how you would like to proceed.    Thank you,  Jad

## 2024-02-07 ENCOUNTER — OFFICE VISIT (OUTPATIENT)
Dept: FAMILY MEDICINE CLINIC | Facility: CLINIC | Age: 49
End: 2024-02-07
Payer: COMMERCIAL

## 2024-02-07 VITALS
OXYGEN SATURATION: 98 % | BODY MASS INDEX: 30.09 KG/M2 | RESPIRATION RATE: 17 BRPM | DIASTOLIC BLOOD PRESSURE: 77 MMHG | HEIGHT: 73 IN | SYSTOLIC BLOOD PRESSURE: 130 MMHG | WEIGHT: 227 LBS | HEART RATE: 71 BPM

## 2024-02-07 DIAGNOSIS — I10 ESSENTIAL HYPERTENSION: Primary | ICD-10-CM

## 2024-02-07 PROCEDURE — 99213 OFFICE O/P EST LOW 20 MIN: CPT | Performed by: FAMILY MEDICINE

## 2024-02-07 NOTE — H&P
HPI:    Jad Perez is a 48 year old male presents clinic for follow-up regarding hypertension.  Patient takes lisinopril every morning.  Exercises regularly.  Lately, has noticed has been waking up with elevated pressures.  Ranging from 140s over 80s-160s over 80s.  Feels uncomfortable after eating certain foods, when he feels his pressure may be creeping up.  Occasional headaches, dizziness.  Denies chest pain, difficulty breathing, syncopal episodes.      HISTORY:  Past Medical History:   Diagnosis Date    Bradycardia     Deviated nasal septum     High blood pressure     Hypercholesteremia 04/19/2016    Hypertrophy of nasal turbinates     Hypertrophy of tonsil     Tendonitis of knee, left       Past Surgical History:   Procedure Laterality Date    COLONOSCOPY N/A 11/10/2022    Procedure: COLONOSCOPY;  Surgeon: Edison Jamil MD;  Location: [facility] ENDO    EP LOOP RECORDER IMPLANT      nov 2017    EXCISION TURBINATE,SUBMUCOUS      REMOVAL OF TONSILS,12+ Y/O      REPAIR OF NASAL SEPTUM      TONSILLECTOMY      Uvulectomy.       Family History   Problem Relation Age of Onset    Diabetes Father     Diabetes Paternal Grandfather       Social History:   Social History     Socioeconomic History    Marital status:     Number of children: 3   Occupational History    Occupation: Financial   Tobacco Use    Smoking status: Never    Smokeless tobacco: Never   Vaping Use    Vaping Use: Never used   Substance and Sexual Activity    Alcohol use: Yes     Alcohol/week: 0.0 standard drinks of alcohol     Comment: occasional    Drug use: No        Medications (Active prior to today's visit):  Current Outpatient Medications   Medication Sig Dispense Refill    Apremilast (OTEZLA) 30 MG Oral Tab Take 30 mg by mouth in the morning and 30 mg before bedtime. 60 tablet 2    Apremilast (OTEZLA) 30 MG Oral Tab Take 30 mg by mouth 2 (two) times daily. 60 tablet 1    Meloxicam 15 MG Oral Tab Take 1 tablet (15 mg total) by  mouth daily. 90 tablet 3    lisinopril 10 MG Oral Tab Take 1 tablet (10 mg total) by mouth daily. 90 tablet 3    albuterol 108 (90 Base) MCG/ACT Inhalation Aero Soln inhale 2 puff by inhalation route  every 4 - 6 hours as needed 3 each 1    Cholecalciferol (VITAMIN D) 2000 units Oral Cap Take by mouth daily.      aspirin 81 MG Oral Tab Take 1 tablet (81 mg total) by mouth daily.      Multiple Vitamin (MULTI VITAMIN MENS) Oral Tab Take 1 tablet by mouth daily.         Allergies:  No Known Allergies      Depression Screening (PHQ-2/PHQ-9): Over the LAST 2 WEEKS   Little interest or pleasure in doing things: Not at all    Feeling down, depressed, or hopeless: Not at all    PHQ-2 SCORE: 0           ROS:   Review of Systems   All other systems reviewed and are negative.      PHYSICAL EXAM:     Vitals:    02/07/24 1121 02/07/24 1125   BP: 149/82 130/77   BP Location: Right arm Right arm   Patient Position: Sitting Sitting   Cuff Size: large large   Pulse: 71    Resp: 17    SpO2: 98%    Weight: 227 lb (103 kg)    Height: 6' 1\" (1.854 m)      Physical Exam  Constitutional:       General: He is not in acute distress.  Cardiovascular:      Rate and Rhythm: Normal rate.   Pulmonary:      Effort: Pulmonary effort is normal. No respiratory distress.   Neurological:      Mental Status: He is alert.   Psychiatric:         Mood and Affect: Mood normal.         ASSESSMENT/PLAN:   (I10) Essential hypertension  (primary encounter diagnosis)  Plan:   -Continue lifestyle modifications encouraged.  Lisinopril increased to 20 mg.  Follow-up in 2 weeks or sooner if needed       Responsible party/patient verbalized understanding of information discussed. No barriers to learning observed.          Orders This Visit:  Orders Placed This Encounter   Procedures    Fluzone Quadrivalent 6mo+ 0.5mL       Meds This Visit:  Requested Prescriptions      No prescriptions requested or ordered in this encounter       Imaging & Referrals:  INFLUENZA VAC,  QUAD, PRSV FREE, 0.5 ML       The 21st Century cures Act makes medical notes like these available to patients in the interest of transparency.  However, be advised that this is a medical document.  It is intended as peer to peer communication.  It is written in medical language and may contain abbreviations or verbiage that are unfamiliar.  It may appear blunt or direct.  Medical documents are intended to carry relevant information, facts as evident, and the clinical opinion of the practitioner.      This note was created by [employer] voice recognition. Errors in content may be related to improper recognition by the system; efforts to review and correct have been done but errors may still exist. Please contact me with any questions.       2/7/2024  Kev Diaz MD

## 2024-02-08 ENCOUNTER — PATIENT MESSAGE (OUTPATIENT)
Dept: FAMILY MEDICINE CLINIC | Facility: CLINIC | Age: 49
End: 2024-02-08

## 2024-02-09 NOTE — TELEPHONE ENCOUNTER
MAINtagt message sent to patient instructing to call nurse triage to speak directly to a nurse regarding symptoms as per department protocol.      Future Appointments   Date Time Provider Department Center   2/26/2024  8:15 AM Kev Diaz MD [facility]

## 2024-02-09 NOTE — TELEPHONE ENCOUNTER
From: Jad Perez  To: Kev Diaz  Sent: 2/8/2024 2:28 PM CST  Subject: Covid    Hey Dr. Diaz,    Hope you are having a great day. Just as a heads up, my wife and youngest son have tested positive for Covid today. I am having all of the same symptoms as they did yesterday. I have yet to test positive but wanted to let you know.    Any questions let me know.    Thank you,  Jad

## 2024-02-12 NOTE — TELEPHONE ENCOUNTER
Covid  Message [id number]  From  Brea Mota RN To  Jad Perez Sent and Delivered  2/9/2024 10:40 AM   Last Read in MyChart  2/9/2024 10:41 AM by Jad Perez

## 2024-02-26 ENCOUNTER — OFFICE VISIT (OUTPATIENT)
Dept: FAMILY MEDICINE CLINIC | Facility: CLINIC | Age: 49
End: 2024-02-26
Payer: COMMERCIAL

## 2024-02-26 VITALS
BODY MASS INDEX: 29.42 KG/M2 | HEIGHT: 73 IN | HEART RATE: 76 BPM | DIASTOLIC BLOOD PRESSURE: 70 MMHG | WEIGHT: 222 LBS | SYSTOLIC BLOOD PRESSURE: 135 MMHG | RESPIRATION RATE: 17 BRPM | OXYGEN SATURATION: 98 %

## 2024-02-26 DIAGNOSIS — I10 HYPERTENSION, UNSPECIFIED TYPE: Primary | ICD-10-CM

## 2024-02-26 PROCEDURE — 99213 OFFICE O/P EST LOW 20 MIN: CPT | Performed by: FAMILY MEDICINE

## 2024-02-26 NOTE — H&P
HPI:    Jad Perez is a 48 year old male presents clinic for follow-up.  Hypertension.  At last visit, lisinopril was increased to 20 mg, patient has noticed mild improvement in pressures.  Has been exercising more, also has improved diet.  Patient denies HAs, blurry vision, nausea, vomiting, CP, palpitations, dizziness, SOB, or other symptoms of concern.         HISTORY:  Past Medical History:   Diagnosis Date    Bradycardia     Deviated nasal septum     High blood pressure     Hypercholesteremia 04/19/2016    Hypertrophy of nasal turbinates     Hypertrophy of tonsil     Tendonitis of knee, left       Past Surgical History:   Procedure Laterality Date    COLONOSCOPY N/A 11/10/2022    Procedure: COLONOSCOPY;  Surgeon: Edison Jamil MD;  Location: [facility] ENDO    EP LOOP RECORDER IMPLANT      nov 2017    EXCISION TURBINATE,SUBMUCOUS      REMOVAL OF TONSILS,12+ Y/O      REPAIR OF NASAL SEPTUM      TONSILLECTOMY      Uvulectomy.       Family History   Problem Relation Age of Onset    Diabetes Father     Diabetes Paternal Grandfather       Social History:   Social History     Socioeconomic History    Marital status:     Number of children: 3   Occupational History    Occupation: Financial   Tobacco Use    Smoking status: Never    Smokeless tobacco: Never   Vaping Use    Vaping Use: Never used   Substance and Sexual Activity    Alcohol use: Yes     Alcohol/week: 0.0 standard drinks of alcohol     Comment: occasional    Drug use: No        Medications (Active prior to today's visit):  Current Outpatient Medications   Medication Sig Dispense Refill    Apremilast (OTEZLA) 30 MG Oral Tab Take 30 mg by mouth in the morning and 30 mg before bedtime. 60 tablet 2    Apremilast (OTEZLA) 30 MG Oral Tab Take 30 mg by mouth 2 (two) times daily. 60 tablet 1    Meloxicam 15 MG Oral Tab Take 1 tablet (15 mg total) by mouth daily. 90 tablet 3    lisinopril 10 MG Oral Tab Take 1 tablet (10 mg total) by mouth daily.  (Patient taking differently: Take 2 tablets (20 mg total) by mouth daily.) 90 tablet 3    albuterol 108 (90 Base) MCG/ACT Inhalation Aero Soln inhale 2 puff by inhalation route  every 4 - 6 hours as needed 3 each 1    Cholecalciferol (VITAMIN D) 2000 units Oral Cap Take by mouth daily.      aspirin 81 MG Oral Tab Take 1 tablet (81 mg total) by mouth daily.      Multiple Vitamin (MULTI VITAMIN MENS) Oral Tab Take 1 tablet by mouth daily.         Allergies:  No Known Allergies      Depression Screening (PHQ-2/PHQ-9): Over the LAST 2 WEEKS                         ROS:   Review of Systems   All other systems reviewed and are negative.      PHYSICAL EXAM:     Vitals:    02/26/24 0816 02/26/24 0829   BP: 148/75 135/70   BP Location: Right arm    Patient Position: Sitting    Cuff Size: adult    Pulse: 76    Resp: 17    SpO2: 98%    Weight: 222 lb (100.7 kg)    Height: 6' 1\" (1.854 m)      Physical Exam  Vitals reviewed.   Constitutional:       General: He is not in acute distress.  Cardiovascular:      Rate and Rhythm: Normal rate and regular rhythm.      Heart sounds: Normal heart sounds.   Pulmonary:      Effort: Pulmonary effort is normal. No respiratory distress.      Breath sounds: Normal breath sounds.   Neurological:      Mental Status: He is alert.         ASSESSMENT/PLAN:   (I10) Hypertension, unspecified type  (primary encounter diagnosis)  Plan:   -Blood pressure has improved, at goal.  To continue current management.  Continue lifestyle modifications encouraged.  Follow-up in 3 months or sooner if needed.       Responsible party/patient verbalized understanding of information discussed. No barriers to learning observed.          Orders This Visit:  No orders of the defined types were placed in this encounter.      Meds This Visit:  Requested Prescriptions      No prescriptions requested or ordered in this encounter       Imaging & Referrals:  None       The 21st Century cures Act makes medical notes like these  available to patients in the interest of transparency.  However, be advised that this is a medical document.  It is intended as peer to peer communication.  It is written in medical language and may contain abbreviations or verbiage that are unfamiliar.  It may appear blunt or direct.  Medical documents are intended to carry relevant information, facts as evident, and the clinical opinion of the practitioner.      This note was created by [employer] voice recognition. Errors in content may be related to improper recognition by the system; efforts to review and correct have been done but errors may still exist. Please contact me with any questions.       2/26/2024  Kev Diaz MD

## 2024-04-11 NOTE — TELEPHONE ENCOUNTER
LOV: 7/3/23  Last Refilled:#60, 2rfs 1/24/24  Labs:AST 21  ALT  24  12/4/23  ASSESSMENT/PLAN:         Psoriasis and psoriatic arthritis (polyarthritis and tendinitis)  - Has been having issues getting Otezla approved due to insurance  - stopped SSZ as was still having some joint pain and was not helping with the psoriasis on his foot   - would like to go back on Otezla, PA will be done   - continue meloxicam daily  - Blood work reviewed from March and normal      R plantar fascitis- improved     Hypotrophic Cardiomyopathy  - Patient is following with cardiology, per pt they may send him to [facility] for further w/u  - Currently asymptomatic     Follow up yearly      Gale Mcgarry MD  7/3/2023  Please advise.

## 2024-04-12 RX ORDER — APREMILAST 30 MG/1
1 TABLET, FILM COATED ORAL 2 TIMES DAILY
Qty: 60 TABLET | Refills: 2 | Status: SHIPPED | OUTPATIENT
Start: 2024-04-12

## 2024-05-21 NOTE — TELEPHONE ENCOUNTER
----- Message from Adrien Ochoa MD sent at 11/12/2022 12:23 PM CST -----  I spoke to  [facility]. He is feeling well. His polyps were not adenomatous. I have recommended a high-fiber diet for diverticulosis and a screening colonoscopy in 10 years. GI RNs: Please enter in health maintenance that a colonoscopy was performed and enter colonoscopy recall for 10 years. Addended by: KARIN SMITH on: 5/21/2024 11:58 AM     Modules accepted: Orders

## 2024-07-02 ENCOUNTER — MED REC SCAN ONLY (OUTPATIENT)
Dept: FAMILY MEDICINE CLINIC | Facility: CLINIC | Age: 49
End: 2024-07-02

## 2024-07-03 ENCOUNTER — MED REC SCAN ONLY (OUTPATIENT)
Dept: FAMILY MEDICINE CLINIC | Facility: CLINIC | Age: 49
End: 2024-07-03

## 2024-07-03 RX ORDER — APREMILAST 30 MG/1
1 TABLET, FILM COATED ORAL 2 TIMES DAILY
Qty: 60 TABLET | Refills: 0 | Status: SHIPPED | OUTPATIENT
Start: 2024-07-03

## 2024-07-03 NOTE — TELEPHONE ENCOUNTER
Requested Prescriptions     Pending Prescriptions Disp Refills    OTEZLA 30 MG Oral Tab [Pharmacy Med Name: Otezla 30 MG Oral Tablet] 60 tablet 0     Sig: TAKE 1 TABLET BY MOUTH TWICE  DAILY     Future Appointments   Date Time Provider Department Center   7/9/2024  8:30 AM Kev Diaz MD [facility]     LOV: 7/3/23  Last Refilled:4/12/24 #60 2RF         ASSESSMENT/PLAN:         Psoriasis and psoriatic arthritis (polyarthritis and tendinitis)  - Has been having issues getting Otezla approved due to insurance  - stopped SSZ as was still having some joint pain and was not helping with the psoriasis on his foot   - would like to go back on Otezla, PA will be done   - continue meloxicam daily  - Blood work reviewed from March and normal      R plantar fascitis- improved     Hypotrophic Cardiomyopathy  - Patient is following with cardiology, per pt they may send him to [facility] for further w/u  - Currently asymptomatic     Follow up yearly      Gale Mcgarry MD  7/3/2023  8:40 AM

## 2024-07-09 ENCOUNTER — OFFICE VISIT (OUTPATIENT)
Dept: FAMILY MEDICINE CLINIC | Facility: CLINIC | Age: 49
End: 2024-07-09
Payer: COMMERCIAL

## 2024-07-09 ENCOUNTER — LAB ENCOUNTER (OUTPATIENT)
Dept: LAB | Age: 49
End: 2024-07-09
Attending: FAMILY MEDICINE
Payer: COMMERCIAL

## 2024-07-09 VITALS
WEIGHT: 225 LBS | OXYGEN SATURATION: 98 % | HEIGHT: 73 IN | RESPIRATION RATE: 16 BRPM | HEART RATE: 76 BPM | BODY MASS INDEX: 29.82 KG/M2 | DIASTOLIC BLOOD PRESSURE: 70 MMHG | SYSTOLIC BLOOD PRESSURE: 134 MMHG

## 2024-07-09 DIAGNOSIS — Z00.00 ANNUAL PHYSICAL EXAM: Primary | ICD-10-CM

## 2024-07-09 DIAGNOSIS — I10 ESSENTIAL HYPERTENSION: ICD-10-CM

## 2024-07-09 DIAGNOSIS — Z00.00 ANNUAL PHYSICAL EXAM: ICD-10-CM

## 2024-07-09 LAB
ALBUMIN SERPL-MCNC: 4.2 G/DL (ref 3.2–4.8)
ALBUMIN/GLOB SERPL: 2 {RATIO} (ref 1–2)
ALP LIVER SERPL-CCNC: 68 U/L
ALT SERPL-CCNC: 46 U/L
ANION GAP SERPL CALC-SCNC: 5 MMOL/L (ref 0–18)
AST SERPL-CCNC: 37 U/L (ref ?–34)
BASOPHILS # BLD AUTO: 0.02 X10(3) UL (ref 0–0.2)
BASOPHILS NFR BLD AUTO: 0.3 %
BILIRUB SERPL-MCNC: 0.6 MG/DL (ref 0.3–1.2)
BUN BLD-MCNC: 21 MG/DL (ref 9–23)
BUN/CREAT SERPL: 20.2 (ref 10–20)
CALCIUM BLD-MCNC: 9.3 MG/DL (ref 8.7–10.4)
CHLORIDE SERPL-SCNC: 111 MMOL/L (ref 98–112)
CHOLEST SERPL-MCNC: 194 MG/DL (ref ?–200)
CO2 SERPL-SCNC: 26 MMOL/L (ref 21–32)
CREAT BLD-MCNC: 1.04 MG/DL
DEPRECATED RDW RBC AUTO: 43.4 FL (ref 35.1–46.3)
EGFRCR SERPLBLD CKD-EPI 2021: 88 ML/MIN/1.73M2 (ref 60–?)
EOSINOPHIL # BLD AUTO: 0.09 X10(3) UL (ref 0–0.7)
EOSINOPHIL NFR BLD AUTO: 1.3 %
ERYTHROCYTE [DISTWIDTH] IN BLOOD BY AUTOMATED COUNT: 13.1 % (ref 11–15)
FASTING PATIENT LIPID ANSWER: YES
FASTING STATUS PATIENT QL REPORTED: YES
GLOBULIN PLAS-MCNC: 2.1 G/DL (ref 2–3.5)
GLUCOSE BLD-MCNC: 89 MG/DL (ref 70–99)
HCT VFR BLD AUTO: 45.8 %
HDLC SERPL-MCNC: 46 MG/DL (ref 40–59)
HGB BLD-MCNC: 15.8 G/DL
IMM GRANULOCYTES # BLD AUTO: 0.01 X10(3) UL (ref 0–1)
IMM GRANULOCYTES NFR BLD: 0.1 %
LDLC SERPL CALC-MCNC: 139 MG/DL (ref ?–100)
LYMPHOCYTES # BLD AUTO: 1.93 X10(3) UL (ref 1–4)
LYMPHOCYTES NFR BLD AUTO: 27.7 %
MCH RBC QN AUTO: 31 PG (ref 26–34)
MCHC RBC AUTO-ENTMCNC: 34.5 G/DL (ref 31–37)
MCV RBC AUTO: 89.8 FL
MONOCYTES # BLD AUTO: 0.56 X10(3) UL (ref 0.1–1)
MONOCYTES NFR BLD AUTO: 8 %
NEUTROPHILS # BLD AUTO: 4.36 X10 (3) UL (ref 1.5–7.7)
NEUTROPHILS # BLD AUTO: 4.36 X10(3) UL (ref 1.5–7.7)
NEUTROPHILS NFR BLD AUTO: 62.6 %
NONHDLC SERPL-MCNC: 148 MG/DL (ref ?–130)
OSMOLALITY SERPL CALC.SUM OF ELEC: 296 MOSM/KG (ref 275–295)
PLATELET # BLD AUTO: 225 10(3)UL (ref 150–450)
POTASSIUM SERPL-SCNC: 4.6 MMOL/L (ref 3.5–5.1)
PROT SERPL-MCNC: 6.3 G/DL (ref 5.7–8.2)
RBC # BLD AUTO: 5.1 X10(6)UL
SODIUM SERPL-SCNC: 142 MMOL/L (ref 136–145)
TRIGL SERPL-MCNC: 47 MG/DL (ref 30–149)
TSI SER-ACNC: 2.25 MIU/ML (ref 0.55–4.78)
VLDLC SERPL CALC-MCNC: 9 MG/DL (ref 0–30)
WBC # BLD AUTO: 7 X10(3) UL (ref 4–11)

## 2024-07-09 PROCEDURE — 80061 LIPID PANEL: CPT

## 2024-07-09 PROCEDURE — 80053 COMPREHEN METABOLIC PANEL: CPT

## 2024-07-09 PROCEDURE — 85025 COMPLETE CBC W/AUTO DIFF WBC: CPT

## 2024-07-09 PROCEDURE — 36415 COLL VENOUS BLD VENIPUNCTURE: CPT

## 2024-07-09 PROCEDURE — 99396 PREV VISIT EST AGE 40-64: CPT | Performed by: FAMILY MEDICINE

## 2024-07-09 PROCEDURE — 84443 ASSAY THYROID STIM HORMONE: CPT

## 2024-07-09 RX ORDER — LISINOPRIL AND HYDROCHLOROTHIAZIDE 20; 12.5 MG/1; MG/1
1 TABLET ORAL DAILY
Qty: 90 TABLET | Refills: 1 | Status: SHIPPED | OUTPATIENT
Start: 2024-07-09 | End: 2024-07-09

## 2024-07-09 RX ORDER — LISINOPRIL 20 MG/1
20 TABLET ORAL DAILY
Qty: 90 TABLET | Refills: 1 | Status: SHIPPED | OUTPATIENT
Start: 2024-07-09

## 2024-07-09 NOTE — H&P
HPI:    Jad Perez is a 49 year old male presents to clinic for annual physical exam.  History of hypertension.  Lately, he has had variation in his blood pressures.  He lisinopril 10 mg daily, it was initially prescribed 20 but is almost running out of medication.  Has a balanced diet, he exercises regularly.  Recent appointment with cardiology due to syncopal episode.  Normal echo/stress test.  Normal bowel movements and urination.  Normal sleep habits.      HISTORY:  Past Medical History:    Bradycardia    Deviated nasal septum    High blood pressure    Hypercholesteremia    Hypertrophy of nasal turbinates    Hypertrophy of tonsil    Tendonitis of knee, left      Past Surgical History:   Procedure Laterality Date    Colonoscopy N/A 11/10/2022    Procedure: COLONOSCOPY;  Surgeon: Edison Jamil MD;  Location: [facility]    Ep loop recorder implant      nov 2017    Excision turbinate,submucous      Removal of tonsils,12+ y/o      Repair of nasal septum      Tonsillectomy      Uvulectomy.       Family History   Problem Relation Age of Onset    Diabetes Father     Diabetes Paternal Grandfather       Social History:   Social History     Socioeconomic History    Marital status:     Number of children: 3   Occupational History    Occupation: Financial   Tobacco Use    Smoking status: Never    Smokeless tobacco: Never   Vaping Use    Vaping status: Never Used   Substance and Sexual Activity    Alcohol use: Yes     Alcohol/week: 0.0 standard drinks of alcohol     Comment: occasional    Drug use: No     Social Determinants of Health      Received from [facility], [facility]    Housing Stability        Medications (Active prior to today's visit):  Current Outpatient Medications   Medication Sig Dispense Refill    lisinopril 20 MG Oral Tab Take 1 tablet (20 mg total) by mouth daily. 90 tablet 1    Apremilast (OTEZLA) 30 MG Oral Tab Take 1 tablet by mouth 2 (two)  times daily. 60 tablet 2    Meloxicam 15 MG Oral Tab Take 1 tablet (15 mg total) by mouth daily. 90 tablet 3    albuterol 108 (90 Base) MCG/ACT Inhalation Aero Soln inhale 2 puff by inhalation route  every 4 - 6 hours as needed 3 each 1    Cholecalciferol (VITAMIN D) 2000 units Oral Cap Take by mouth daily.      aspirin 81 MG Oral Tab Take 1 tablet (81 mg total) by mouth daily.      Multiple Vitamin (MULTI VITAMIN MENS) Oral Tab Take 1 tablet by mouth daily.         Allergies:  No Known Allergies         ROS:   Review of Systems   All other systems reviewed and are negative.      PHYSICAL EXAM:     Vitals:    07/09/24 0829 07/09/24 0831 07/09/24 0912   BP: 154/76 158/77 134/70   BP Location: Right arm Right arm    Patient Position: Sitting Sitting    Cuff Size: large large    Pulse: 76     Resp: 16     SpO2: 98%     Weight: 225 lb (102.1 kg)     Height: 6' 1\" (1.854 m)       Physical Exam  Vitals reviewed.   Constitutional:       General: He is not in acute distress.  HENT:      Head: Normocephalic and atraumatic.      Right Ear: Tympanic membrane, ear canal and external ear normal.      Left Ear: Tympanic membrane, ear canal and external ear normal.      Nose: Nose normal.      Mouth/Throat:      Pharynx: Uvula midline.   Eyes:      Conjunctiva/sclera: Conjunctivae normal.      Pupils: Pupils are equal, round, and reactive to light.   Neck:      Thyroid: No thyromegaly.   Cardiovascular:      Rate and Rhythm: Normal rate and regular rhythm.      Heart sounds: Normal heart sounds. No murmur heard.  Pulmonary:      Effort: Pulmonary effort is normal. No respiratory distress.      Breath sounds: Normal breath sounds. No wheezing or rales.   Abdominal:      General: Bowel sounds are normal. There is no distension.      Palpations: Abdomen is soft.      Tenderness: There is no abdominal tenderness. There is no guarding or rebound.   Musculoskeletal:      Cervical back: Normal range of motion and neck supple.    Lymphadenopathy:      Cervical: No cervical adenopathy.   Neurological:      Mental Status: He is alert.         ASSESSMENT/PLAN:   (Z00.00) Annual physical exam  (primary encounter diagnosis)  Plan: CBC W Differential W Platelet [E], Comp         Metabolic Panel (14) [E], TSH W Reflex To Free         T4 [E], Lipid Panel [E]  - Immunizations UTD   - Reinforced healthy diet, lifestyle, and exercise.  - Past Medical/Social/Family histories reviewed  - Regular dental visits recommended   - Regular eye exams recommended     Health Maintenance   Topic Date Due    Colorectal Cancer Screening  11/10/2032       Follow up in 1 year or sooner if needed      (I10) Essential hypertension  Plan:   -Blood pressure initially elevated, improved on second reading.  Asymptomatic.  Reports intermittent lower extremity edema.  Pending lab test results, can consider low-dose hydrochlorothiazide.  Will continue to follow.               Responsible party/patient verbalized understanding of information discussed. No barriers to learning observed.            Orders This Visit:  Orders Placed This Encounter   Procedures    CBC W Differential W Platelet [E]    Comp Metabolic Panel (14) [E]    TSH W Reflex To Free T4 [E]    Lipid Panel [E]       Meds This Visit:  Requested Prescriptions     Signed Prescriptions Disp Refills    lisinopril 20 MG Oral Tab 90 tablet 1     Sig: Take 1 tablet (20 mg total) by mouth daily.       Imaging & Referrals:  None     Chaperone offered at visit today.     The 21st Century cures Act makes medical notes like these available to patients in the interest of transparency.  However, be advised that this is a medical document.  It is intended as peer to peer communication.  It is written in medical language and may contain abbreviations or verbiage that are unfamiliar.  It may appear blunt or direct.  Medical documents are intended to carry relevant information, facts as evident, and the clinical opinion of the  practitioner.      This note was created by Dragon voice recognition. Errors in content may be related to improper recognition by the system; efforts to review and correct have been done but errors may still exist. Please contact me with any questions.       7/9/2024  Kev Diaz MD

## 2024-07-19 ENCOUNTER — PATIENT MESSAGE (OUTPATIENT)
Dept: FAMILY MEDICINE CLINIC | Facility: CLINIC | Age: 49
End: 2024-07-19

## 2024-07-19 RX ORDER — HYDROCHLOROTHIAZIDE 12.5 MG/1
12.5 CAPSULE, GELATIN COATED ORAL DAILY
Qty: 30 CAPSULE | Refills: 0 | Status: SHIPPED | OUTPATIENT
Start: 2024-07-19

## 2024-07-19 NOTE — TELEPHONE ENCOUNTER
From: Jad Perez  To: Kev Diaz  Sent: 7/19/2024 8:50 AM CDT  Subject: Blood pressure meds    Hey Dr. Diaz,    Hope you had a great week and looking forward to a relaxing weekend.    I wanted to follow-up from my annual physical from a week or two ago.    I'm not 100% sure the blood pressure medication I am on is working. My blood pressure continues to be high and even reaching 175/95 last night.    I'm having no symptoms and feel fine otherwise. Can we try another medication or add in the water pill as we discussed to help get my blood pressure under control.    Any questions please let me know.    Thank you,  Jad

## 2024-07-19 NOTE — TELEPHONE ENCOUNTER
Please review my chart message and note from 7/9/24 physical:    (I10) Essential hypertension  Plan:   -Blood pressure initially elevated, improved on second reading.  Asymptomatic.  Reports intermittent lower extremity edema.  Pending lab test results, can consider low-dose hydrochlorothiazide.  Will continue to follow.

## 2024-07-19 NOTE — TELEPHONE ENCOUNTER
Pls let pt know rx for hydrochlorothiazide sent. Can cause urinary frequency and lower potasium, so advise on eating an orange or banana every day. Make f/u appt in 3-4 wk with Dr. Diaz.

## 2024-08-10 RX ORDER — APREMILAST 30 MG/1
1 TABLET, FILM COATED ORAL 2 TIMES DAILY
Qty: 60 TABLET | Refills: 2 | Status: SHIPPED | OUTPATIENT
Start: 2024-08-10

## 2024-08-10 NOTE — TELEPHONE ENCOUNTER
Requested Prescriptions     Pending Prescriptions Disp Refills    OTEZLA 30 MG Oral Tab [Pharmacy Med Name: Otezla 30 MG Oral Tablet] 60 tablet 2     Sig: TAKE 1 TABLET BY MOUTH TWICE  DAILY     Future Appointments   Date Time Provider Department Center   11/11/2024  9:40 AM Gale Mcgarry MD [facility]     LOV: 7/3/23   Last Refilled:4/12/24 #60 2RF    ASSESSMENT/PLAN:         Psoriasis and psoriatic arthritis (polyarthritis and tendinitis)  - Has been having issues getting Otezla approved due to insurance  - stopped SSZ as was still having some joint pain and was not helping with the psoriasis on his foot   - would like to go back on Otezla, PA will be done   - continue meloxicam daily  - Blood work reviewed from March and normal      R plantar fascitis- improved     Hypotrophic Cardiomyopathy  - Patient is following with cardiology, per pt they may send him to [facility] for further w/u  - Currently asymptomatic     Follow up yearly      Gale Mcgarry MD  7/3/2023  8:40 AM

## 2024-08-16 RX ORDER — HYDROCHLOROTHIAZIDE 12.5 MG/1
12.5 CAPSULE, GELATIN COATED ORAL DAILY
Qty: 90 CAPSULE | Refills: 1 | Status: SHIPPED | OUTPATIENT
Start: 2024-08-16

## 2024-08-16 RX ORDER — HYDROCHLOROTHIAZIDE 12.5 MG/1
12.5 CAPSULE, GELATIN COATED ORAL DAILY
Qty: 30 CAPSULE | Refills: 0 | OUTPATIENT
Start: 2024-08-16

## 2024-08-16 NOTE — TELEPHONE ENCOUNTER
Refill passed per [facility] protocol.     Requested Prescriptions   Pending Prescriptions Disp Refills    hydroCHLOROthiazide 12.5 MG Oral Cap 30 capsule 0     Sig: Take 1 capsule (12.5 mg total) by mouth daily.       Hypertension Medications Protocol Passed - 8/15/2024  7:49 PM        Passed - CMP or BMP in past 12 months        Passed - Last BP reading less than 140/90     BP Readings from Last 1 Encounters:   07/09/24 134/70               Passed - In person appointment or virtual visit in the past 12 mos or appointment in next 3 mos     Recent Outpatient Visits              1 month ago Annual physical exam    [facility] Kev Lorenzo MD    Office Visit    5 months [facility], [facility]    [facility]    6 months ago [facility]    [facility]    11 months ago [facility]    [facility]    1 year ago Medication monitoring encounter    [facility] Gale Mcgarry MD    Office Visit          Future Appointments         Provider Department Appt Notes    In 2 months Gale Mcgarry MD [facility] follow up -- medication, psoriatic arthritis  Policy advised                    Passed - EGFRCR or GFRNAA > 50     GFR Evaluation  EGFRCR: 88 , resulted on 7/9/2024

## 2024-08-16 NOTE — TELEPHONE ENCOUNTER
Disp Refills Start End    hydroCHLOROthiazide 12.5 MG Oral Cap 90 capsule 1 8/16/2024 --    Sig - Route: Take 1 capsule (12.5 mg total) by mouth daily. - Oral    Sent to pharmacy as: hydroCHLOROthiazide 12.5 MG Oral Capsule    E-Prescribing Status: Receipt confirmed by pharmacy (8/16/2024  9:39 AM CDT)      Pharmacy    [id number] [address], [phone], [phone]

## 2024-10-22 ENCOUNTER — OFFICE VISIT (OUTPATIENT)
Dept: RHEUMATOLOGY | Facility: CLINIC | Age: 49
End: 2024-10-22
Payer: COMMERCIAL

## 2024-10-22 VITALS
WEIGHT: 216 LBS | HEART RATE: 61 BPM | SYSTOLIC BLOOD PRESSURE: 122 MMHG | HEIGHT: 73 IN | BODY MASS INDEX: 28.63 KG/M2 | DIASTOLIC BLOOD PRESSURE: 71 MMHG

## 2024-10-22 DIAGNOSIS — L40.50 PSORIATIC ARTHRITIS (HCC): Primary | ICD-10-CM

## 2024-10-22 DIAGNOSIS — Z51.81 ENCOUNTER FOR THERAPEUTIC DRUG MONITORING: ICD-10-CM

## 2024-10-22 DIAGNOSIS — L40.9 PSORIASIS: ICD-10-CM

## 2024-10-22 PROCEDURE — 99214 OFFICE O/P EST MOD 30 MIN: CPT | Performed by: INTERNAL MEDICINE

## 2024-10-22 NOTE — PROGRESS NOTES
Jad Perez is a 49 year old male.    HPI:     Chief Complaint   Patient presents with    Psoriatic Arthritis    Follow - Up    Psoriasis     /I had the pleasure of seeing Jad Perez on 10/22/2024 for follow up Psoriasis and PsA.     Current Medications:  Meloxicam 15 mg daily  Otezla- restarted 7/2023  Previous medications:  Otezla 30 mg twice day- started 10/2018-9/2022  SSZ 1000 mg BID- started 9/2022- 5/2023, did not help     Interval History:  This is a 44-year-old male with history of psoriasis for the past 20 years presents for follow-up of psoriatic arthritis.  He was started on Otezla in October and has noticed that his arthritis has been controlled.  He has been having marital films and recently  from his wife causing more depression.  He stopped taking Otezla for about 1-2 months in June and noticed more joint pain, especially in his knees.  He restarted Otezla about 1 month ago and joint pain feels better.  Continues to have plantar fasciitis mostly in his right foot due to running.  In May 2019 he completed a marathon and ran about 40 miles.  No skin lesions    9/18/2021:  Pt seen today for PsA. Last seen Sept 2020  Reports that his psoriasis and psoriatic arthritis are stable  He continues to run marathons and is doing well  His most recent marathon he had bilateral hand swelling most in the dorsal aspect. He was drinking a lot of water and salt tablets at that time. No pain  Continues to take meloxicam daily    9/19/2022:  Pt seen today for PsA. Last seen Sept 2021  Has been on otezla and doing well  No psoriatic flares  Has been having issues getting otezla due to insurance     3/20/2023:  Pt seen today for PsA.  On SSZ 1000 mg BID  Having some more hip pain, not sure if its from working out and running  Rash has come back on foot and buttocks     7/3/2023:  Pt seen today for PsA.  Stopped SSZ about 1 month ago  Still having psoriasis on the foot, felt better on otezla   Now  running more and having some ankle and feet pain  No swelling in the joints  Both ankles swelled on a 6-7 hr flight, was not painful    10/22/2024:  Pt seen today for PsA.  On otezla now, joints and skin are doing well  Continues to stay active  Did a recent bike ride 100 miles   Psoriasis around the elbows have resolved           HISTORY:  Past Medical History:    Bradycardia    Deviated nasal septum    High blood pressure    Hypercholesteremia    Hypertrophy of nasal turbinates    Hypertrophy of tonsil    Tendonitis of knee, left      Social Hx Reviewed   Family Hx Reviewed     Medications (Active prior to today's visit):  Current Outpatient Medications   Medication Sig Dispense Refill    hydroCHLOROthiazide 12.5 MG Oral Cap Take 1 capsule (12.5 mg total) by mouth daily. 90 capsule 1    Apremilast (OTEZLA) 30 MG Oral Tab Take 1 tablet by mouth 2 (two) times daily. 60 tablet 2    lisinopril 20 MG Oral Tab Take 1 tablet (20 mg total) by mouth daily. 90 tablet 1    Meloxicam 15 MG Oral Tab Take 1 tablet (15 mg total) by mouth daily. 90 tablet 3    albuterol 108 (90 Base) MCG/ACT Inhalation Aero Soln inhale 2 puff by inhalation route  every 4 - 6 hours as needed 3 each 1    Cholecalciferol (VITAMIN D) 2000 units Oral Cap Take by mouth daily.      aspirin 81 MG Oral Tab Take 1 tablet (81 mg total) by mouth daily.      Multiple Vitamin (MULTI VITAMIN MENS) Oral Tab Take 1 tablet by mouth daily.       .cmed  Allergies:  No Known Allergies      ROS:   All other ROS are negative.     PHYSICAL EXAM:   GEN: AAOx3, NAD  HEENT: EOMI, PERRLA, no injection or icterus, oral mucosa moist, no oral lesions. No lymphadenopathy. No facial rash  CVS: RRR, no murmurs rubs or gallops. Equal 2+ distal pulses.   LUNGS: CTAB, no increased work of breathing  ABDOMEN:  soft NT/ND, +BS, no HSM  SKIN: Ppsoriatic plaque on elbows resolved, no rash on the soles of his feet  MSK:  Cervical spine: FROM  Hands: no synovitis in DIP, PIP and MCP,  strong full fists  Wrist: FROM, no pain or swelling or warmth on palpation  Elbow: FROM, no pain or swelling or warmth on palpation  Shoulders: FROM, no pain or swelling or warmth on palpation  Hip: normal log roll, no lateral hip pain, JUSTIN test negative b/l  Knees: FROM, no warmth or effusion present. No pain with ROM.   Ankles: FROM, no pain or swelling or warmth on palpation  Feet: no pain with MTP squeeze, no toe swelling or pain or warmth on palpation with FROM  Spine: no lumbar or sacral pain on palpation.  NEURO: Cranial nerves II-XII intact grossly. 5/5 strength throughout in both upper and lower extremities, sensation intact.  PSYCH: normal mood       LABS:     Component      Latest Ref Rng & Units 7/22/2019   WBC      4.0 - 11.0 x10(3) uL 5.7   RBC      4.30 - 5.70 x10(6)uL 4.60   Hemoglobin      13.0 - 17.5 g/dL 13.8   Hematocrit      39.0 - 53.0 % 42.2   MCV      80.0 - 100.0 fL 91.7   MCH      26.0 - 34.0 pg 30.0   MCHC      31.0 - 37.0 g/dL 32.7   RDW-SD      35.1 - 46.3 fL 41.6   RDW      11.0 - 15.0 % 12.4   Platelet Count      150.0 - 450.0 10(3)uL 214.0   Prelim Neutrophil Abs      1.50 - 7.70 x10 (3) uL 3.12   Neutrophils Absolute      1.50 - 7.70 x10(3) uL 3.12   Lymphocytes Absolute      1.00 - 4.00 x10(3) uL 1.83   Monocytes Absolute      0.10 - 1.00 x10(3) uL 0.62   Eosinophils Absolute      0.00 - 0.70 x10(3) uL 0.12   Basophils Absolute      0.00 - 0.20 x10(3) uL 0.04   Immature Granulocyte Absolute      0.00 - 1.00 x10(3) uL 0.01   Neutrophils %      % 54.3   Lymphocytes %      % 31.9   Monocytes %      % 10.8   Eosinophils %      % 2.1   Basophils %      % 0.7   Immature Granulocyte %      % 0.2   Glucose      70 - 99 mg/dL 87   Sodium      136 - 145 mmol/L 142   Potassium      3.5 - 5.1 mmol/L 4.9   Chloride      98 - 112 mmol/L 110   Carbon Dioxide, Total      21.0 - 32.0 mmol/L 30.0   ANION GAP      0 - 18 mmol/L 2   BUN      7 - 18 mg/dL 18   CREATININE      0.70 - 1.30 mg/dL 1.02    BUN/CREAT Ratio      10.0 - 20.0 17.6   CALCIUM      8.5 - 10.1 mg/dL 8.7   CALCULATED OSMOLALITY      275 - 295 mOsm/kg 295   eGFR NON-AFR. AMERICAN      >=60 89   eGFR       >=60 103   ALT (SGPT)      16 - 61 U/L 34   AST (SGOT)      15 - 37 U/L 17   ALKALINE PHOSPHATASE      45 - 117 U/L 57   Total Bilirubin      0.1 - 2.0 mg/dL 0.5   TOTAL PROTEIN      6.4 - 8.2 g/dL 6.3 (L)   Albumin      3.4 - 5.0 g/dL 3.6   Globulin      2.8 - 4.4 g/dL 2.7 (L)   A/G Ratio      1.0 - 2.0 1.3   Patient Fasting?       Yes     Component      Latest Ref Rng & Units 10/6/2018   C-REACTIVE PROTEIN      0.0 - 0.9 mg/dL 0.5   SED RATE      0 - 15 mm/Hr 6       Imaging:     XR b/l hands: normal     XR SI joints: normal     XR R foot:   1. Minimal degenerative changes first metatarsal head.  2. Tiny plantar spur.  3. Suggestive of pes planus on lateral view. Otherwise, essentially negative radiographs of the right foot    Echocardiogram  Tricuspid regurgitation and right ventricle systolic pressure  Increased    Cardiac MRI:  1. Asymmetric septal hypertrophy variant of hypertrophic cardiomyopathy with hypertrophy of basal to mid septum having maximum end-diastolic wall thickness of 16mm.  No LV outflow tract obstruction.  Patchy fibrosis in hypertrophied inferoseptal segment   and contiguous small viable subendocardial infarct mid inferoseptal/junction with inferior segment (RCA territory).  2. Normal LV systolic function.  LVEF 60%.  3. Mildly dilated LV chamber Indexed EDV:          136.99 ml/m2     (normal range 64-99)  4. Normal right ventricle.  RVEF 62%.  5. Mildly dilated left atrium.  6. Qualitatively mild to moderate tricuspid regurgitation.  Indirectly calculated tricuspid regurgitation volume 41 mL, tricuspid regurgitation fraction 27%.  7. Artifact related to subcutaneous loop recorder left chest wall.    ASSESSMENT/PLAN:     Psoriasis and psoriatic arthritis (polyarthritis and tendinitis)- stable  - On  Otezla and doing really well  - continue meloxicam as needed  - Recent blood work July 2024 normal kidney and liver test normal CBC    R plantar fascitis- improved    Hypotrophic Cardiomyopathy  - Patient is following with cardiology, per pt they may send him to [facility] for further w/u  - Currently asymptomatic     Follow up yearly     There is a longitudinal care relationship with me, the care plan reflects the ongoing nature of the continuous relationship of care, and the medical record indicates that there is ongoing treatment of a serious/complex medical condition which I am currently managing.  is Applicable.     Gale Mcgarry MD  10/22/2024  10:46 AM

## 2024-11-01 NOTE — TELEPHONE ENCOUNTER
LOV: 10/22/24  Future Appointments   Date Time Provider Department Center   10/22/2025  8:20 AM Gale Mcgarry MD [facility]     ASSESSMENT/PLAN:      Psoriasis and psoriatic arthritis (polyarthritis and tendinitis)- stable  - On Otezla and doing really well  - continue meloxicam as needed  - Recent blood work July 2024 normal kidney and liver test normal CBC     R plantar fascitis- improved     Hypotrophic Cardiomyopathy  - Patient is following with cardiology, per pt they may send him to [facility] for further w/u  - Currently asymptomatic     Follow up yearly      There is a longitudinal care relationship with me, the care plan reflects the ongoing nature of the continuous relationship of care, and the medical record indicates that there is ongoing treatment of a serious/complex medical condition which I am currently managing.  is Applicable.      Gale Mcgarry MD  10/22/2024  10:46 AM

## 2024-11-03 RX ORDER — MELOXICAM 15 MG/1
15 TABLET ORAL DAILY
Qty: 90 TABLET | Refills: 3 | Status: SHIPPED | OUTPATIENT
Start: 2024-11-03

## 2024-11-21 NOTE — TELEPHONE ENCOUNTER
Requested Prescriptions     Pending Prescriptions Disp Refills    OTEZLA 30 MG Oral Tab [Pharmacy Med Name: Otezla 30 MG Oral Tablet] 60 tablet 2     Sig: TAKE 1 TABLET BY MOUTH TWICE  DAILY     Future Appointments   Date Time Provider Department Center   10/22/2025  8:20 AM Gale Mcgarry MD [facility]     LOV: 10/22/24   Last Refilled:8/10/24 #60 2RF       ASSESSMENT/PLAN:      Psoriasis and psoriatic arthritis (polyarthritis and tendinitis)- stable  - On Otezla and doing really well  - continue meloxicam as needed  - Recent blood work July 2024 normal kidney and liver test normal CBC     R plantar fascitis- improved     Hypotrophic Cardiomyopathy  - Patient is following with cardiology, per pt they may send him to [facility] for further w/u  - Currently asymptomatic     Follow up yearly      There is a longitudinal care relationship with me, the care plan reflects the ongoing nature of the continuous relationship of care, and the medical record indicates that there is ongoing treatment of a serious/complex medical condition which I am currently managing.  is Applicable.      Gale Mcgarry MD  10/22/2024  10:46 AM

## 2024-11-22 RX ORDER — APREMILAST 30 MG/1
1 TABLET, FILM COATED ORAL 2 TIMES DAILY
Qty: 60 TABLET | Refills: 2 | Status: SHIPPED | OUTPATIENT
Start: 2024-11-22

## 2025-01-02 ENCOUNTER — TELEPHONE (OUTPATIENT)
Dept: RHEUMATOLOGY | Facility: CLINIC | Age: 50
End: 2025-01-02

## 2025-01-02 DIAGNOSIS — L40.9 PSORIASIS: ICD-10-CM

## 2025-01-02 DIAGNOSIS — L40.50 PSORIATIC ARTHRITIS (HCC): Primary | ICD-10-CM

## 2025-01-02 DIAGNOSIS — Z51.81 ENCOUNTER FOR THERAPEUTIC DRUG MONITORING: ICD-10-CM

## 2025-01-02 NOTE — TELEPHONE ENCOUNTER
PA start    Prior authorization for: Otezla     Medication form: 30mg bid    Submission method:[employer]    Tracking #: [id number]    QF-TB result: due for annual TB. Pended for provider review.   Component      Latest Ref Rng 8/28/2023   QUANTIFERON(R)-TB GOLD PLUS, 1 TUBE      NEGATIVE  NEGATIVE    NIL      IU/mL 0.01    MITOGEN-NIL      IU/mL 8.04    TB1-NIL      IU/mL 0.02    TB2-NIL      IU/mL 0.04          Once approved will need script sent to [facility].

## 2025-01-02 NOTE — TELEPHONE ENCOUNTER
Patient sent a notification through [employer] informing change of insurance and in need of a new PA for Otezla medication. Please advise.    have new insurance for 2025 and need to get the refill process for Otezla started. Below are the details for [facility].     Prescription fax - [phone]  Prior Auth - [phone]

## 2025-01-03 RX ORDER — LISINOPRIL 20 MG/1
20 TABLET ORAL DAILY
Qty: 90 TABLET | Refills: 1 | Status: SHIPPED | OUTPATIENT
Start: 2025-01-03

## 2025-01-03 RX ORDER — APREMILAST 30 MG/1
1 TABLET, FILM COATED ORAL 2 TIMES DAILY
Qty: 60 TABLET | Refills: 2 | Status: SHIPPED | OUTPATIENT
Start: 2025-01-03

## 2025-01-03 NOTE — TELEPHONE ENCOUNTER
PA Approved    Prior authorization for: Otezla    Medication form: 30 mg     Approval #: [id number]    Approved dates: 1/2/25 to 1/2/26    Pharmacy for medication: [facility]    QF-TB results: pended for provider to sign off

## 2025-01-03 NOTE — TELEPHONE ENCOUNTER
Refill Passed Per Protocol    Requested Prescriptions   Pending Prescriptions Disp Refills    LISINOPRIL 20 MG Oral Tab [Pharmacy Med Name: LISINOPRIL 20MG TABLETS] 90 tablet 1     Sig: TAKE 1 TABLET(20 MG) BY MOUTH DAILY       Hypertension Medications Protocol Passed - 1/3/2025 11:33 AM        Passed - CMP or BMP in past 12 months        Passed - Last BP reading less than 140/90     BP Readings from Last 1 Encounters:   10/22/24 122/71               Passed - In person appointment or virtual visit in the past 12 mos or appointment in next 3 mos     Recent Outpatient Visits              2 months ago [facility]    [facility] Visit    5 months ago Annual physical exam    [facility] ([place], [place]) Kev Lorenzo MD    Office Visit    10 months ago Hypertension, unspecified type    [facility] ([place] [place] Kev Lorenzo MD    Office Visit    11 months ago Essential hypertension    [facility]    Office Visit    1 year ago Annual physical exam    [facility] ([place] [place] Kev Lorenzo MD    Office Visit          Future Appointments         Provider Department Appt Notes    In 9 months [facility]                     Passed - EGFRCR or GFRNAA > 50     GFR Evaluation  EGFRCR: 88 , resulted on 7/9/2024               Future Appointments         Provider Department Appt Notes    In 9 months [facility]           Recent Outpatient Visits              2 months ago Psoriatic [facility]HCC)    [facility]    Office Visit    5 months ago Annual physical exam    [facility] ([place] [place] Kev Lorenzo MD     Office Visit    10 months ago Hypertension, unspecified type    [facility] Kev Lorenzo MD    Office Visit    11 months ago Essential hypertension    [facility] Kev Lorenzo MD    Office Visit    1 year ago Annual physical exam    [facility] Kev Lorenzo MD    Office Visit

## 2025-01-09 NOTE — TELEPHONE ENCOUNTER
Lm on pt's voicemail that his bowel prep was sent to the pharmacy
Margorie Bence,    Please sign pended prep    Colonoscopy is on 11/10/2022
Pt states RX for prep for 11/10/22 colonoscopy was not received at pharmacy. Please send RX.
rx sent e-scribe.
PCP,   Phone: (   )    -  Fax: (   )    -  Follow Up Time: 1 week

## 2025-02-10 RX ORDER — HYDROCHLOROTHIAZIDE 12.5 MG/1
12.5 CAPSULE ORAL DAILY
Qty: 90 CAPSULE | Refills: 3 | Status: SHIPPED | OUTPATIENT
Start: 2025-02-10

## 2025-02-10 NOTE — TELEPHONE ENCOUNTER
Refill passed per [facility] protocol.  Requested Prescriptions   Pending Prescriptions Disp Refills    HYDROCHLOROTHIAZIDE 12.5 MG Oral Cap [Pharmacy Med Name: HYDROCHLOROTHIAZIDE 12.5MG CAPSULES] 90 capsule 1     Sig: TAKE 1 CAPSULE(12.5 MG) BY MOUTH DAILY       Hypertension Medications Protocol Passed - 2/10/2025 10:04 AM        Passed - CMP or BMP in past 12 months        Passed - Last BP reading less than 140/90     BP Readings from Last 1 Encounters:   10/22/24 122/71               Passed - In person appointment or virtual visit in the past 12 mos or appointment in next 3 mos     Recent Outpatient Visits              3 months ago Psoriatic arthritis (HCC)    [facility] Gale Mcgarry MD    Office Visit    7 months ago Annual physical exam    [facility] Kev Diaz MD    Office Visit    11 months ago Hypertension, unspecified type    [facility] Kev Lorenzo MD    Office Visit    1 year ago Essential hypertension    [facility] Kev Diaz MD    Office Visit    1 year ago Annual physical exam    [facility] Kev Diaz MD    Office Visit          Future Appointments         Provider Department Appt Notes    In 8 months Gale Mcgarry MD [facility]                     Passed - EGFRCR or GFRNAA > 50     GFR Evaluation  EGFRCR: 88 , resulted on 7/9/2024          Passed - Medication is active on med list           Future Appointments         Provider Department Appt Notes    In 8 months Gale Mcgarry MD [facility]           Recent Outpatient Visits              3 months ago Psoriatic arthritis (HCC)    [facility] Gale Mcgarry MD    Office Visit    7 months ago Annual  physical exam    [facility] Kev Lorenzo MD    Office Visit    11 months ago Hypertension, unspecified type    [facility] [place] [place]    Office Visit    1 year ago Essential hypertension    [facility]    Office Visit    1 year ago Annual physical exam    [facility]    Office Visit

## 2025-02-18 ENCOUNTER — MED REC SCAN ONLY (OUTPATIENT)
Dept: FAMILY MEDICINE CLINIC | Facility: CLINIC | Age: 50
End: 2025-02-18

## 2025-03-17 DIAGNOSIS — L40.9 PSORIASIS: ICD-10-CM

## 2025-03-17 DIAGNOSIS — L40.50 PSORIATIC ARTHRITIS (HCC): ICD-10-CM

## 2025-03-17 RX ORDER — APREMILAST 30 MG/1
1 TABLET, FILM COATED ORAL 2 TIMES DAILY
Qty: 60 TABLET | Refills: 2 | Status: SHIPPED | OUTPATIENT
Start: 2025-03-17

## 2025-03-17 NOTE — TELEPHONE ENCOUNTER
LOV:   Future Appointments   Date Time Provider Department Center   10/22/2025  8:20 AM Gale Mcgarry MD [facility]       ASSESSMENT/PLAN:      Psoriasis and psoriatic arthritis (polyarthritis and tendinitis)- stable  - On Otezla and doing really well  - continue meloxicam as needed  - Recent blood work July 2024 normal kidney and liver test normal CBC     R plantar fascitis- improved     Hypotrophic Cardiomyopathy  - Patient is following with cardiology, per pt they may send him to [facility] for further w/u  - Currently asymptomatic     Follow up yearly      There is a longitudinal care relationship with me, the care plan reflects the ongoing nature of the continuous relationship of care, and the medical record indicates that there is ongoing treatment of a serious/complex medical condition which I am currently managing.  is Applicable.      Gale Mcgarry MD  10/22/2024  10:46 AM

## 2025-05-14 ENCOUNTER — HOSPITAL ENCOUNTER (OUTPATIENT)
Age: 50
Discharge: HOME | End: 2025-05-14
Payer: COMMERCIAL

## 2025-05-14 VITALS
DIASTOLIC BLOOD PRESSURE: 71 MMHG | OXYGEN SATURATION: 99 % | SYSTOLIC BLOOD PRESSURE: 114 MMHG | HEART RATE: 67 BPM | TEMPERATURE: 99 F | RESPIRATION RATE: 22 BRPM

## 2025-05-14 DIAGNOSIS — R42 VERTIGO: Primary | ICD-10-CM

## 2025-05-14 PROCEDURE — 99213 OFFICE O/P EST LOW 20 MIN: CPT | Performed by: NURSE PRACTITIONER

## 2025-05-14 NOTE — ED INITIAL ASSESSMENT (HPI)
Pt states has been feeling dizzy for last 4-5 days.   States having dizziness from lying to sitting/standing.   Denies any dizziness sitting down moving head side to side.   Denies n/v.   States dizziness has improved in last couple days.   Does feel pressure to bilateral ears.

## 2025-05-14 NOTE — DISCHARGE INSTRUCTIONS
- Review separate handouts regarding [place]-Hallpike maneuver (to determine laterality), Epley maneuver, and modified Semont maneuver (treatment methods)   - Perform Epley or modified Semont meneuver 3x daily until symptoms have resolved for 2-days   - You may benefit from Zyrtec or Xyzal daily   - You may benefit from Sudafed (pseudoephedrine - behind pharmacy counter) every 4-6hrs to help with inner ear congestion contributing to symptoms   - Follow up with ENT specialist if symptoms do not improve (contact provided)

## 2025-05-14 NOTE — ED PROVIDER NOTES
History     Chief Complaint   Patient presents with    Dizziness       Subjective:   HPI    Jad Perez, 50 year old male with notable medical history of HTN, psoriatic arthritis, COPD who presents with vertigo symptoms. Patient reports room spinning sensation when changing positions from laying to sitting / standing, which last for approx 10-sec before self resolving. Symptoms are only present if starting from lying position. Denies ear pain, discharge, confusion, rhinorrhea, HA, vision changes.      Problem List[1]   Objective:   Past Medical History:    Bradycardia    Deviated nasal septum    High blood pressure    Hypercholesteremia    Hypertrophy of nasal turbinates    Hypertrophy of tonsil    Tendonitis of knee, left              Past Surgical History:   Procedure Laterality Date    Colonoscopy N/A 11/10/2022    Procedure: COLONOSCOPY;  Surgeon: Edison Jamil MD;  Location: [facility]    Ep loop recorder implant      nov 2017    Excision turbinate,submucous      Removal of tonsils,12+ y/o      Repair of nasal septum      Tonsillectomy      Uvulectomy.                 No pertinent social history.            Medications Ordered Prior to Encounter[2]      Constitutional and vital signs reviewed.      All other systems reviewed and negative except as noted above.    I have reviewed the family history, social history, allergies, and outpatient medications.     History reviewed from EMR: Encounters, problem list, allergies, medications      Physical Exam     ED Triage Vitals [05/14/25 1209]   /71   Pulse 67   Resp 22   Temp 98.7 °F (37.1 °C)   Temp src Oral   SpO2 99 %   O2 Device None (Room air)       Current:/71   Pulse 67   Temp 98.7 °F (37.1 °C) (Oral)   Resp 22   SpO2 99%       Physical Exam  Vitals and nursing note reviewed.   Constitutional:       General: He is not in acute distress.     Appearance: Normal appearance. He is normal weight. He is not ill-appearing or  toxic-appearing.   HENT:      Head: Normocephalic and atraumatic.      Right Ear: Hearing, tympanic membrane, ear canal and external ear normal.      Left Ear: Hearing, tympanic membrane, ear canal and external ear normal.      Ears:      Comments: Benign bilateral ear exams     Nose: Nose normal. No congestion or rhinorrhea.      Mouth/Throat:      Mouth: Mucous membranes are moist.   Eyes:      Extraocular Movements: Extraocular movements intact.      Conjunctiva/sclera: Conjunctivae normal.      Pupils: Pupils are equal, round, and reactive to light.   Cardiovascular:      Rate and Rhythm: Normal rate.      Pulses: Normal pulses.   Pulmonary:      Effort: Pulmonary effort is normal. No respiratory distress.   Musculoskeletal:         General: No swelling, tenderness or signs of injury. Normal range of motion.      Cervical back: Normal range of motion.   Skin:     General: Skin is warm and dry.      Capillary Refill: Capillary refill takes less than 2 seconds.   Neurological:      General: No focal deficit present.      Mental Status: He is alert and oriented to person, place, and time. Mental status is at baseline.   Psychiatric:         Mood and Affect: Mood normal.         Behavior: Behavior normal.         Thought Content: Thought content normal.         Judgment: Judgment normal.            ED Course     Labs Reviewed - No data to display  No orders to display       Vitals:    05/14/25 1209   BP: 114/71   Pulse: 67   Resp: 22   Temp: 98.7 °F (37.1 °C)   TempSrc: Oral   SpO2: 99%            [facility]        Jad GARCIA Perez, 50 year old male with medical history as noted above who presents with vertigo   - Patient in NAD VSS   - vertiginous (BPPV) vs ETD vs infection vs orthostatic hypotension vs other   - Less likely central cause   - HPI and exam c/w BPPV   - Supportive care discussed   - Home maneuvers (Arnulfo-Hallpike, Epley, and modified Semont) via graphic print-out provided   - ENT contact provided    -Ambulatory out of IC with steady gait       ** See ED course below for additional information on care provided / interventions / notable events throughout patient's encounter.           ** I have independently reviewed the radiology images, clinical lab results, and ECG tracings as described above (if applicable)    ** Concerning co-morbidities possibly affecting complaint / care: n/a        Medical Decision Making  Risk  OTC drugs.        Disposition and Plan     Disposition:  Discharge  5/14/2025 12:41 pm    Clinical Impression:  1. Vertigo            Home care instructions:       - Review separate handouts regarding [place]-Hallpike maneuver (to determine laterality), Epley maneuver, and modified Semont maneuver (treatment methods)   - Perform Epley or modified Semont meneuver 3x daily until symptoms have resolved for 2-days   - You may benefit from Zyrtec or Xyzal daily   - You may benefit from Sudafed (pseudoephedrine - behind pharmacy counter) every 4-6hrs to help with inner ear congestion contributing to symptoms   - Follow up with ENT specialist if symptoms do not improve (contact provided)      Follow-up:  Brenden Clarke MD  [address] [facility] [place] [address]  [phone]      Ear, Nose, Throat specialist          Medications Prescribed:  Discharge Medication List as of 5/14/2025 12:43 PM            Star Valdez, DNP, APRN, AGACNP-BC, FNP-C, CNL  Adult-Gerontology Acute Care & Family Nurse Practitioner  [facility]      The above patient (and/or guardian) was made aware that an appropriate evaluation has been performed, and that no additional testing is required at this time. In my medical judgment, there is currently no evidence of an immediate life-threatening or surgical condition, therefore discharge is indicated at this time. The patient (and/or guardian) was advised that a small risk still exists that a serious condition could develop. The patient was instructed to arrange close  follow-up with their primary care provider (or the referral provider given today). The patient received written and verbal instructions regarding their condition / concerns, demonstrated understanding, and is agreement with the outpatient treatment plan.              [1]   Patient Active Problem List  Diagnosis    Abnormal ECG    Snorings    HTN (hypertension)    Hypercholesteremia    Major depressive disorder, single episode, in remission    Syncope    Fatigue    Erectile dysfunction    Psoriatic arthritis (HCC)    Psoriasis    Dyspnea on exertion    Chronic obstructive pulmonary disease, unspecified (HCC)   [2]   No current facility-administered medications on file prior to encounter.     Current Outpatient Medications on File Prior to Encounter   Medication Sig Dispense Refill    OTEZLA 30 MG Oral Tab TAKE 1 TABLET BY MOUTH 2 TIMES A DAY 60 tablet 2    hydroCHLOROthiazide 12.5 MG Oral Cap Take 1 capsule (12.5 mg total) by mouth daily. 90 capsule 3    lisinopril 20 MG Oral Tab Take 1 tablet (20 mg total) by mouth daily. 90 tablet 1    Meloxicam 15 MG Oral Tab Take 1 tablet (15 mg total) by mouth daily. 90 tablet 3    albuterol 108 (90 Base) MCG/ACT Inhalation Aero Soln inhale 2 puff by inhalation route  every 4 - 6 hours as needed 3 each 1    Cholecalciferol (VITAMIN D) 2000 units Oral Cap Take by mouth daily.      aspirin 81 MG Oral Tab Take 1 tablet (81 mg total) by mouth daily.      Multiple Vitamin (MULTI VITAMIN MENS) Oral Tab Take 1 tablet by mouth daily.

## 2025-06-17 DIAGNOSIS — L40.50 PSORIATIC ARTHRITIS (HCC): ICD-10-CM

## 2025-06-17 DIAGNOSIS — L40.9 PSORIASIS: ICD-10-CM

## 2025-06-17 NOTE — TELEPHONE ENCOUNTER
Requested Prescriptions     Pending Prescriptions Disp Refills    OTEZLA 30 MG Oral Tab [Pharmacy Med Name: OTEZLA 30MG] 60 tablet 2     Sig: TAKE 1 TABLET BY MOUTH 2 TIMES A DAY        Future Appointments   Date Time Provider Department Center   6/23/2025  3:00 PM Daysi Mello MD [facility]   10/22/2025  8:20 AM Gale Mcgarry MD [facility]     LOV: 10/22/24   Last Refilled:3/17/25 #60 2RF      ASSESSMENT/PLAN:      Psoriasis and psoriatic arthritis (polyarthritis and tendinitis)- stable  - On Otezla and doing really well  - continue meloxicam as needed  - Recent blood work July 2024 normal kidney and liver test normal CBC     R plantar fascitis- improved     Hypotrophic Cardiomyopathy  - Patient is following with cardiology, per pt they may send him to [facility] for further w/u  - Currently asymptomatic     Follow up yearly      There is a longitudinal care relationship with me, the care plan reflects the ongoing nature of the continuous relationship of care, and the medical record indicates that there is ongoing treatment of a serious/complex medical condition which I am currently managing.  is Applicable.      Gale Mcgarry MD  10/22/2024  10:46 AM

## 2025-06-18 RX ORDER — APREMILAST 30 MG/1
1 TABLET, FILM COATED ORAL 2 TIMES DAILY
Qty: 60 TABLET | Refills: 2 | Status: SHIPPED | OUTPATIENT
Start: 2025-06-18

## 2025-06-20 PROBLEM — R06.09 DYSPNEA ON EXERTION: Status: RESOLVED | Noted: 2019-02-26 | Resolved: 2025-06-20

## 2025-06-20 PROBLEM — I48.91 FIBRILLATION, ATRIAL (HCC): Status: ACTIVE | Noted: 2019-04-08

## 2025-06-20 PROBLEM — R55 SYNCOPE: Status: RESOLVED | Noted: 2017-11-21 | Resolved: 2025-06-20

## 2025-06-20 PROBLEM — I49.1 PREMATURE ATRIAL CONTRACTION: Status: ACTIVE | Noted: 2022-06-03

## 2025-06-20 PROBLEM — I42.1 HOCM (HYPERTROPHIC OBSTRUCTIVE CARDIOMYOPATHY) (HCC): Status: ACTIVE | Noted: 2019-04-09

## 2025-06-20 PROBLEM — R53.83 FATIGUE: Status: RESOLVED | Noted: 2018-07-23 | Resolved: 2025-06-20

## 2025-06-20 NOTE — TELEPHONE ENCOUNTER
Medications - Current[1]  lisinopril 20 MG Oral Tab, Take 1 tablet (20 mg total) by mouth daily., Disp: 90 tablet, Rfl: 1        [1]   Current Outpatient Medications:     OTEZLA 30 MG Oral Tab, TAKE 1 TABLET BY MOUTH 2 TIMES A DAY, Disp: 60 tablet, Rfl: 2    hydroCHLOROthiazide 12.5 MG Oral Cap, Take 1 capsule (12.5 mg total) by mouth daily., Disp: 90 capsule, Rfl: 3    lisinopril 20 MG Oral Tab, Take 1 tablet (20 mg total) by mouth daily., Disp: 90 tablet, Rfl: 1    Meloxicam 15 MG Oral Tab, Take 1 tablet (15 mg total) by mouth daily., Disp: 90 tablet, Rfl: 3    albuterol 108 (90 Base) MCG/ACT Inhalation Aero Soln, inhale 2 puff by inhalation route  every 4 - 6 hours as needed, Disp: 3 each, Rfl: 1    Cholecalciferol (VITAMIN D) 2000 units Oral Cap, Take by mouth daily., Disp: , Rfl:     aspirin 81 MG Oral Tab, Take 1 tablet (81 mg total) by mouth daily., Disp: , Rfl:     Multiple Vitamin (MULTI VITAMIN MENS) Oral Tab, Take 1 tablet by mouth daily., Disp: , Rfl:

## 2025-06-23 RX ORDER — LISINOPRIL 20 MG/1
20 TABLET ORAL DAILY
Qty: 90 TABLET | Refills: 0 | Status: SHIPPED | OUTPATIENT
Start: 2025-06-23

## 2025-06-23 NOTE — TELEPHONE ENCOUNTER
Refill passed per [facility] protocols.    Asking for refill to get to appointment date:  Future Appointments   Date Time Provider Department Center   8/19/2025  9:00 AM Daysi Mello MD [facility]       Requested Prescriptions   Pending Prescriptions Disp Refills    lisinopril 20 MG Oral Tab 90 tablet 0     Sig: Take 1 tablet (20 mg total) by mouth daily.       Hypertension Medications Protocol Passed - 6/23/2025  5:49 PM

## 2025-08-19 ENCOUNTER — OFFICE VISIT (OUTPATIENT)
Facility: CLINIC | Age: 50
End: 2025-08-19

## 2025-08-19 ENCOUNTER — RESULTS FOLLOW-UP (OUTPATIENT)
Facility: CLINIC | Age: 50
End: 2025-08-19

## 2025-08-19 ENCOUNTER — LAB ENCOUNTER (OUTPATIENT)
Dept: LAB | Facility: REFERENCE LAB | Age: 50
End: 2025-08-19
Attending: FAMILY MEDICINE

## 2025-08-19 VITALS
HEART RATE: 68 BPM | OXYGEN SATURATION: 98 % | BODY MASS INDEX: 26.37 KG/M2 | WEIGHT: 199 LBS | SYSTOLIC BLOOD PRESSURE: 120 MMHG | HEIGHT: 73 IN | DIASTOLIC BLOOD PRESSURE: 80 MMHG

## 2025-08-19 DIAGNOSIS — I49.1 PREMATURE ATRIAL CONTRACTION: ICD-10-CM

## 2025-08-19 DIAGNOSIS — L40.50 PSORIATIC ARTHRITIS (HCC): ICD-10-CM

## 2025-08-19 DIAGNOSIS — Z12.5 SCREENING PSA (PROSTATE SPECIFIC ANTIGEN): ICD-10-CM

## 2025-08-19 DIAGNOSIS — Z00.00 ENCOUNTER FOR GENERAL ADULT MEDICAL EXAMINATION W/O ABNORMAL FINDINGS: Primary | ICD-10-CM

## 2025-08-19 DIAGNOSIS — Z00.00 ENCOUNTER FOR GENERAL ADULT MEDICAL EXAMINATION W/O ABNORMAL FINDINGS: ICD-10-CM

## 2025-08-19 DIAGNOSIS — I10 PRIMARY HYPERTENSION: ICD-10-CM

## 2025-08-19 DIAGNOSIS — E78.00 HYPERCHOLESTEREMIA: ICD-10-CM

## 2025-08-19 DIAGNOSIS — I42.1 HOCM (HYPERTROPHIC OBSTRUCTIVE CARDIOMYOPATHY) (HCC): ICD-10-CM

## 2025-08-19 DIAGNOSIS — L40.9 PSORIASIS: ICD-10-CM

## 2025-08-19 PROBLEM — J44.9 CHRONIC OBSTRUCTIVE PULMONARY DISEASE, UNSPECIFIED (HCC): Status: RESOLVED | Noted: 2022-09-19 | Resolved: 2025-08-19

## 2025-08-19 PROBLEM — I48.91 FIBRILLATION, ATRIAL (HCC): Status: RESOLVED | Noted: 2019-04-08 | Resolved: 2025-08-19

## 2025-08-19 LAB
ANION GAP SERPL CALC-SCNC: 6 MMOL/L (ref 0–18)
BASOPHILS # BLD AUTO: 0.02 X10(3) UL (ref 0–0.2)
BASOPHILS NFR BLD AUTO: 0.4 %
BUN BLD-MCNC: 21 MG/DL (ref 9–23)
BUN/CREAT SERPL: 18.9 (ref 10–20)
CALCIUM BLD-MCNC: 9.4 MG/DL (ref 8.7–10.4)
CHLORIDE SERPL-SCNC: 104 MMOL/L (ref 98–112)
CHOLEST SERPL-MCNC: 185 MG/DL (ref ?–200)
CO2 SERPL-SCNC: 29 MMOL/L (ref 21–32)
COMPLEXED PSA SERPL-MCNC: 1.34 NG/ML (ref ?–4)
CREAT BLD-MCNC: 1.11 MG/DL (ref 0.7–1.3)
DEPRECATED RDW RBC AUTO: 41.4 FL (ref 35.1–46.3)
EGFRCR SERPLBLD CKD-EPI 2021: 81 ML/MIN/1.73M2 (ref 60–?)
EOSINOPHIL # BLD AUTO: 0.07 X10(3) UL (ref 0–0.7)
EOSINOPHIL NFR BLD AUTO: 1.3 %
ERYTHROCYTE [DISTWIDTH] IN BLOOD BY AUTOMATED COUNT: 12.5 % (ref 11–15)
EST. AVERAGE GLUCOSE BLD GHB EST-MCNC: 108 MG/DL (ref 68–126)
FASTING PATIENT LIPID ANSWER: YES
FASTING STATUS PATIENT QL REPORTED: YES
GLUCOSE BLD-MCNC: 81 MG/DL (ref 70–99)
HBA1C MFR BLD: 5.4 % (ref ?–5.7)
HCT VFR BLD AUTO: 47.5 % (ref 39–53)
HDLC SERPL-MCNC: 46 MG/DL (ref 40–59)
HGB BLD-MCNC: 16.1 G/DL (ref 13–17.5)
IMM GRANULOCYTES # BLD AUTO: 0.01 X10(3) UL (ref 0–1)
IMM GRANULOCYTES NFR BLD: 0.2 %
LDLC SERPL CALC-MCNC: 129 MG/DL (ref ?–100)
LYMPHOCYTES # BLD AUTO: 1.65 X10(3) UL (ref 1–4)
LYMPHOCYTES NFR BLD AUTO: 31.3 %
MCH RBC QN AUTO: 30.6 PG (ref 26–34)
MCHC RBC AUTO-ENTMCNC: 33.9 G/DL (ref 31–37)
MCV RBC AUTO: 90.1 FL (ref 80–100)
MONOCYTES # BLD AUTO: 0.44 X10(3) UL (ref 0.1–1)
MONOCYTES NFR BLD AUTO: 8.3 %
NEUTROPHILS # BLD AUTO: 3.08 X10 (3) UL (ref 1.5–7.7)
NEUTROPHILS # BLD AUTO: 3.08 X10(3) UL (ref 1.5–7.7)
NEUTROPHILS NFR BLD AUTO: 58.5 %
NONHDLC SERPL-MCNC: 139 MG/DL (ref ?–130)
OSMOLALITY SERPL CALC.SUM OF ELEC: 290 MOSM/KG (ref 275–295)
PLATELET # BLD AUTO: 248 10(3)UL (ref 150–450)
POTASSIUM SERPL-SCNC: 4.2 MMOL/L (ref 3.5–5.1)
RBC # BLD AUTO: 5.27 X10(6)UL (ref 4.3–5.7)
SODIUM SERPL-SCNC: 139 MMOL/L (ref 136–145)
TRIGL SERPL-MCNC: 55 MG/DL (ref 30–149)
TSI SER-ACNC: 1.66 UIU/ML (ref 0.55–4.78)
VLDLC SERPL CALC-MCNC: 10 MG/DL (ref 0–30)
WBC # BLD AUTO: 5.3 X10(3) UL (ref 4–11)

## 2025-08-19 PROCEDURE — 36415 COLL VENOUS BLD VENIPUNCTURE: CPT | Performed by: FAMILY MEDICINE

## 2025-08-19 PROCEDURE — 80061 LIPID PANEL: CPT | Performed by: FAMILY MEDICINE

## 2025-08-19 PROCEDURE — 80048 BASIC METABOLIC PNL TOTAL CA: CPT | Performed by: FAMILY MEDICINE

## 2025-08-19 PROCEDURE — 85025 COMPLETE CBC W/AUTO DIFF WBC: CPT | Performed by: FAMILY MEDICINE

## 2025-08-19 PROCEDURE — 84443 ASSAY THYROID STIM HORMONE: CPT | Performed by: FAMILY MEDICINE

## 2025-08-19 PROCEDURE — 99386 PREV VISIT NEW AGE 40-64: CPT | Performed by: FAMILY MEDICINE

## 2025-08-19 PROCEDURE — 83036 HEMOGLOBIN GLYCOSYLATED A1C: CPT | Performed by: FAMILY MEDICINE

## 2025-08-19 RX ORDER — TIRZEPATIDE 5 MG/.5ML
INJECTION, SOLUTION SUBCUTANEOUS
COMMUNITY

## (undated) DIAGNOSIS — R06.00 DYSPNEA ON EXERTION: ICD-10-CM

## (undated) DEVICE — LINE MNTR ADLT SET O2 INTMD

## (undated) DEVICE — FORCEP RADIAL JAW 4

## (undated) DEVICE — 35 ML SYRINGE REGULAR TIP: Brand: MONOJECT

## (undated) DEVICE — KIT CLEAN ENDOKIT 1.1OZ GOWNX2

## (undated) DEVICE — MEDI-VAC NON-CONDUCTIVE SUCTION TUBING 6MM X 1.8M (6FT.) L: Brand: CARDINAL HEALTH

## (undated) DEVICE — KIT ENDO ORCAPOD 160/180/190

## (undated) DEVICE — SNARE CAPTIFLEX MICRO-OVL OLY

## (undated) NOTE — MR AVS SNAPSHOT
[facility]  [address], [address] [place] [address]  [phone]               Thank you for choosing us for your health care visit with Ed Fraire MD.  We are glad to serve you and happy to provide you with this summary o authorization numbers or be assured that none are required. You can then schedule your appointment. Failure to obtain required authorization numbers can create reimbursement difficulties for you.     Assoc Dx:  Chronic pain of left knee [M25.562, G89.29] Don’t eat while distracted and slow down. Avoid over sized portions. Don’t eat while when you’re bored.      EAT THESE FOODS MORE OFTEN: EAT THESE FOODS LESS OFTEN:   Make half your plate fruits and vegetables Highly refined, white starches including wh

## (undated) NOTE — LETTER
August 22, 2018         Gloria Soto MD  [address] [facility]      Patient: Igor Grey   YOB: 1975   Date of Visit: 8/22/2018       Dear Dr. Susanne Burt MD,    I saw your patient, Igor Grey, on 8/22/201

## (undated) NOTE — MR AVS SNAPSHOT
[facility]  [address], [address]  [phone]               Thank you for choosing us for your health care visit with Lefty Lamb MD.  We are glad to serve you and happy to provide you with this summary o triamcinolone acetonide 0.1 % Crea   Apply topically 2 (two) times daily as needed. Commonly known as:  KENALOG                   Results of Recent Testing       [employer]     Sign up for [employer], your secure online medical record.   [employer] will allow you

## (undated) NOTE — LETTER
6/26/2019          To Whom It May Concern:    Akil Mendoza is currently under my medical care and was seen in clinic for an acute visit. Please excuse her absence from 6/27/19 - 7/10/19. He is cleared to return on 7/11/19 with no restrictions.      If

## (undated) NOTE — MR AVS SNAPSHOT
[place]  [address]  [phone]               Thank you for choosing us for your health care visit with Juan Burns MD.  We are glad to serve you and happy to provide you with this mercedes visit,  view other health information, and more. To sign up or find more information, go to [email or web address] and click on the Sign Up Now link in the [employer] box.      Enter your [email or web address] Activation Code exactly as it appears below along with yo

## (undated) NOTE — LETTER
10/9/2018              Roe Barthel        [address] [facility], [address]         Dear Maxx Fred,    [facility] records indicate that the tests ordered for you by Ady Mercer MD  have not been done.   If you have, in fact, already completed the erika

## (undated) NOTE — LETTER
[facility], [place]  Authorization for Invasive Procedures  1.  I hereby authorize Dr. Rosi Ray , my physician and whomever may be designated as the doctor's assistant, to perform the following operation and/or procedure:  Kenia Dempsey performed for the purposes of advancing medicine, science, and/or education, provided my identity is not revealed. If the procedure has been videotaped, the physician/surgeon will obtain the original videotape.  The hospital will not be responsible for stor My signature below affirms that prior to the time of the procedure, I have explained to the patient and/or his legal representative, the risks and benefits involved in the proposed treatment and any reasonable alternative to the proposed treatment.  I have

## (undated) NOTE — ED AVS SNAPSHOT
Je Maya   MRN: [id number]    Department:  [facility] Emergency Department   Date of Visit:  12/29/2018           Disclosure     Insurance plans vary and the physician(s) referred by the ER may not be covered by your plan.  Please cont CARE PHYSICIAN AT ONCE OR RETURN IMMEDIATELY TO THE EMERGENCY DEPARTMENT. If you have been prescribed any medication(s), please fill your prescription right away and begin taking the medication(s) as directed.   If you believe that any of the medications

## (undated) NOTE — LETTER
[address] [place] [address]  Authorization for Invasive Procedures  1.  I hereby authorize  Dr. Simone Patricio  , my physician and whomever may be designated as the doctor's assistant, to perform the following operation and/or procedure: Paul Hewitt performed for the purposes of advancing medicine, science, and/or education, provided my identity is not revealed. If the procedure has been videotaped, the physician/surgeon will obtain the original videotape.  The hospital will not be responsible for stor My signature below affirms that prior to the time of the procedure, I have explained to the patient and/or his legal representative, the risks and benefits involved in the proposed treatment and any reasonable alternative to the proposed treatment.  I have

## (undated) NOTE — ED AVS SNAPSHOT
[facility] Emergency Department  [address]   [place] [address]  Phone:  [phone]  Fax:  [phone] [facility]   MRN: [id number]    Department:  [facility] Emergency Department   Date of Visit:  7/17/2017 and Class Registration line at [phone] or find a doctor online by visiting [email or web address].    IF THERE IS ANY CHANGE OR WORSENING OF YOUR CONDITION, CALL YOUR PRIMARY CARE PHYSICIAN AT ONCE OR RETURN IMMEDIATELY TO [address] [place] [address].     If

## (undated) NOTE — Clinical Note
Thank you for the consult. I saw Mr. Perez in the endocrine/diabetes clinic today. Please see attached my note. Please feel free to contact me with any questions. Thanks!